# Patient Record
Sex: MALE | Race: WHITE | NOT HISPANIC OR LATINO | Employment: OTHER | URBAN - METROPOLITAN AREA
[De-identification: names, ages, dates, MRNs, and addresses within clinical notes are randomized per-mention and may not be internally consistent; named-entity substitution may affect disease eponyms.]

---

## 2021-02-26 RX ORDER — GLIPIZIDE 5 MG/1
TABLET ORAL
Qty: 90 TABLET | Refills: 1 | Status: SHIPPED | OUTPATIENT
Start: 2021-02-26 | End: 2021-05-04 | Stop reason: ALTCHOICE

## 2021-04-01 RX ORDER — AMLODIPINE BESYLATE 10 MG/1
TABLET ORAL
Qty: 90 TABLET | Refills: 0 | Status: SHIPPED | OUTPATIENT
Start: 2021-04-01 | End: 2021-06-30

## 2021-04-18 RX ORDER — HYDROCHLOROTHIAZIDE 25 MG/1
TABLET ORAL
Qty: 90 TABLET | Refills: 1 | Status: SHIPPED | OUTPATIENT
Start: 2021-04-18 | End: 2021-09-13 | Stop reason: SDUPTHER

## 2021-05-03 ENCOUNTER — OFFICE VISIT (OUTPATIENT)
Dept: INTERNAL MEDICINE | Facility: CLINIC | Age: 71
End: 2021-05-03

## 2021-05-03 VITALS
RESPIRATION RATE: 16 BRPM | HEIGHT: 72 IN | DIASTOLIC BLOOD PRESSURE: 82 MMHG | OXYGEN SATURATION: 98 % | SYSTOLIC BLOOD PRESSURE: 134 MMHG | BODY MASS INDEX: 30.88 KG/M2 | HEART RATE: 64 BPM | TEMPERATURE: 97.5 F | WEIGHT: 228 LBS

## 2021-05-03 DIAGNOSIS — I10 ESSENTIAL HYPERTENSION: ICD-10-CM

## 2021-05-03 DIAGNOSIS — E78.2 MIXED HYPERLIPIDEMIA: ICD-10-CM

## 2021-05-03 DIAGNOSIS — E11.8 TYPE II DIABETES MELLITUS WITH COMPLICATION (HCC): Primary | ICD-10-CM

## 2021-05-03 PROCEDURE — 99214 OFFICE O/P EST MOD 30 MIN: CPT | Performed by: INTERNAL MEDICINE

## 2021-05-03 RX ORDER — MAG HYDROX/ALUMINUM HYD/SIMETH 400-400-40
450 SUSPENSION, ORAL (FINAL DOSE FORM) ORAL
COMMUNITY

## 2021-05-03 RX ORDER — ASPIRIN 325 MG
325 TABLET ORAL DAILY
COMMUNITY
End: 2021-10-08 | Stop reason: ALTCHOICE

## 2021-05-03 RX ORDER — LOSARTAN POTASSIUM 100 MG/1
100 TABLET ORAL DAILY
Qty: 90 TABLET | Refills: 2 | Status: SHIPPED | OUTPATIENT
Start: 2021-05-03 | End: 2021-08-17

## 2021-05-03 RX ORDER — LOSARTAN POTASSIUM 100 MG/1
TABLET ORAL
COMMUNITY
Start: 2021-04-18 | End: 2021-05-03 | Stop reason: SDUPTHER

## 2021-05-03 RX ORDER — SIMVASTATIN 40 MG
TABLET ORAL
COMMUNITY
Start: 2021-04-07 | End: 2021-09-13 | Stop reason: SDUPTHER

## 2021-05-03 NOTE — PROGRESS NOTES
"Chief Complaint  Hyperlipidemia, Hypertension, and Diabetes    Subjective          Becki Cruz presents to [facility]  History of Present Illness  Blood sugar has reportedly been good.  No polyuria or polydipsia or visual changes.  No chest pains or shortness of breath.  Taking medication as prescribed.  No side effects reported.  Some numbness in the feet  Objective   Vital Signs:   /82 (BP Location: Left arm, Patient Position: Sitting, Cuff Size: Large Adult)   Pulse 64   Temp 97.5 °F (36.4 °C) (Temporal)   Resp 16   Ht 182.9 cm (72\")   Wt 103 kg (228 lb)   SpO2 98%   BMI 30.92 kg/m²     Physical Exam  Vitals and nursing note reviewed.   Constitutional:       Appearance: Normal appearance.   HENT:      Head: Normocephalic and atraumatic.      Nose: Nose normal.      Mouth/Throat:      Mouth: Mucous membranes are moist.      Pharynx: Oropharynx is clear.   Eyes:      Extraocular Movements: Extraocular movements intact.      Conjunctiva/sclera: Conjunctivae normal.      Pupils: Pupils are equal, round, and reactive to light.   Cardiovascular:      Rate and Rhythm: Normal rate and regular rhythm.      Pulses: Normal pulses.      Heart sounds: No murmur heard.   No friction rub. No gallop.    Pulmonary:      Effort: Pulmonary effort is normal.      Breath sounds: Normal breath sounds. No wheezing, rhonchi or rales.   Abdominal:      General: Abdomen is flat.      Palpations: Abdomen is soft.   Musculoskeletal:         General: No swelling.      Cervical back: Neck supple.      Right lower leg: No edema.      Left lower leg: No edema.   Lymphadenopathy:      Cervical: No cervical adenopathy.   Skin:     General: Skin is warm.      Capillary Refill: Capillary refill takes less than 2 seconds.   Neurological:      General: No focal deficit present.      Mental Status: He is alert and oriented to person, place, and time.      Sensory: Sensory deficit present. "        Result Review : Previous lab work from [facility]                Assessment and Plan type 2 diabetes with weight gain.  He thinks his blood sugars have been pretty well controlled over the past several months.  He does complain of some numbness in the feet.  Not particularly painful and we had a lengthy discussion about neuropathy and blood sugar control.  Continue working on weight reduction.  Follow-up in 3 months for wellness and will talk about closing vaccine care gaps.  Diagnoses and all orders for this visit:    1. Type II diabetes mellitus with complication (CMS/[facility]) (Primary)  -     Hepatitis C Antibody  -     Comprehensive Metabolic Panel  -     Hemoglobin A1c  -     Lipid Panel With / Chol / HDL Ratio  -     Cancel: Microalbumin / Creatinine Urine Ratio - Urine, Clean Catch  -     TSH  -     Ambulatory Referral For Screening Colonoscopy    2. Essential hypertension  -     Hepatitis C Antibody  -     Comprehensive Metabolic Panel  -     Hemoglobin A1c  -     Lipid Panel With / Chol / HDL Ratio  -     Cancel: Microalbumin / Creatinine Urine Ratio - Urine, Clean Catch  -     TSH  -     Ambulatory Referral For Screening Colonoscopy    3. Mixed hyperlipidemia    Other orders  -     losartan (COZAAR) 100 MG tablet; Take 1 tablet by mouth Daily.  Dispense: 90 tablet; Refill: 2        Follow Up   Return in about 3 months (around 8/3/2021) for Medicare Wellness.  Patient was given instructions and counseling regarding his condition or for health maintenance advice. Please see specific information pulled into the AVS if appropriate.

## 2021-05-04 LAB
ALBUMIN SERPL-MCNC: 4.5 G/DL (ref 3.5–5.2)
ALBUMIN/GLOB SERPL: 1.8 G/DL
ALP SERPL-CCNC: 69 U/L (ref 39–117)
ALT SERPL-CCNC: 21 U/L (ref 1–41)
AST SERPL-CCNC: 19 U/L (ref 1–40)
BILIRUB SERPL-MCNC: 0.5 MG/DL (ref 0–1.2)
BUN SERPL-MCNC: 19 MG/DL (ref 8–23)
BUN/CREAT SERPL: 24.7 (ref 7–25)
CALCIUM SERPL-MCNC: 10.5 MG/DL (ref 8.6–10.5)
CHLORIDE SERPL-SCNC: 100 MMOL/L (ref 98–107)
CHOLEST SERPL-MCNC: 136 MG/DL (ref 0–200)
CHOLEST/HDLC SERPL: 3.16 {RATIO}
CO2 SERPL-SCNC: 24.7 MMOL/L (ref 22–29)
CREAT SERPL-MCNC: 0.77 MG/DL (ref 0.76–1.27)
GLOBULIN SER CALC-MCNC: 2.5 GM/DL
GLUCOSE SERPL-MCNC: 170 MG/DL (ref 65–99)
HBA1C MFR BLD: 8.4 % (ref 4.8–5.6)
HCV AB S/CO SERPL IA: <0.1 S/CO RATIO (ref 0–0.9)
HDLC SERPL-MCNC: 43 MG/DL (ref 40–60)
LDLC SERPL CALC-MCNC: 70 MG/DL (ref 0–100)
POTASSIUM SERPL-SCNC: 4.2 MMOL/L (ref 3.5–5.2)
PROT SERPL-MCNC: 7 G/DL (ref 6–8.5)
SODIUM SERPL-SCNC: 138 MMOL/L (ref 136–145)
TRIGL SERPL-MCNC: 127 MG/DL (ref 0–150)
TSH SERPL DL<=0.005 MIU/L-ACNC: 2.23 UIU/ML (ref 0.27–4.2)
VLDLC SERPL CALC-MCNC: 23 MG/DL (ref 5–40)

## 2021-05-04 RX ORDER — GLIPIZIDE 10 MG/1
10 TABLET, FILM COATED, EXTENDED RELEASE ORAL DAILY
Qty: 90 TABLET | Refills: 0 | Status: SHIPPED | OUTPATIENT
Start: 2021-05-04 | End: 2021-05-04 | Stop reason: SDUPTHER

## 2021-05-04 RX ORDER — GLIPIZIDE 10 MG/1
10 TABLET, FILM COATED, EXTENDED RELEASE ORAL DAILY
Qty: 90 TABLET | Refills: 0 | Status: SHIPPED | OUTPATIENT
Start: 2021-05-04 | End: 2021-06-14 | Stop reason: SDUPTHER

## 2021-06-14 RX ORDER — GLIPIZIDE 10 MG/1
10 TABLET, FILM COATED, EXTENDED RELEASE ORAL DAILY
Qty: 90 TABLET | Refills: 0 | Status: SHIPPED | OUTPATIENT
Start: 2021-06-14 | End: 2021-06-18 | Stop reason: SDUPTHER

## 2021-06-18 ENCOUNTER — TELEPHONE (OUTPATIENT)
Dept: INTERNAL MEDICINE | Facility: CLINIC | Age: 71
End: 2021-06-18

## 2021-06-18 RX ORDER — GLIPIZIDE 10 MG/1
10 TABLET, FILM COATED, EXTENDED RELEASE ORAL DAILY
Qty: 90 TABLET | Refills: 0 | Status: SHIPPED | OUTPATIENT
Start: 2021-06-18 | End: 2021-09-13 | Stop reason: SDUPTHER

## 2021-06-18 NOTE — TELEPHONE ENCOUNTER
10 mg was already sent to [facility] but I resent it.  We increased it because it is A1c was elevated last time

## 2021-06-30 RX ORDER — AMLODIPINE BESYLATE 10 MG/1
TABLET ORAL
Qty: 90 TABLET | Refills: 1 | Status: SHIPPED | OUTPATIENT
Start: 2021-06-30 | End: 2021-09-13 | Stop reason: SDUPTHER

## 2021-08-17 RX ORDER — LOSARTAN POTASSIUM 100 MG/1
TABLET ORAL
Qty: 90 TABLET | Refills: 3 | Status: SHIPPED | OUTPATIENT
Start: 2021-08-17 | End: 2021-09-13 | Stop reason: SDUPTHER

## 2021-09-13 RX ORDER — GLIPIZIDE 10 MG/1
10 TABLET, FILM COATED, EXTENDED RELEASE ORAL DAILY
Qty: 90 TABLET | Refills: 0 | Status: SHIPPED | OUTPATIENT
Start: 2021-09-13 | End: 2022-03-08

## 2021-09-13 RX ORDER — HYDROCHLOROTHIAZIDE 25 MG/1
25 TABLET ORAL DAILY
Qty: 90 TABLET | Refills: 1 | Status: SHIPPED | OUTPATIENT
Start: 2021-09-13 | End: 2022-06-02

## 2021-09-13 RX ORDER — AMLODIPINE BESYLATE 10 MG/1
10 TABLET ORAL DAILY
Qty: 90 TABLET | Refills: 1 | Status: SHIPPED | OUTPATIENT
Start: 2021-09-13 | End: 2022-06-02

## 2021-09-13 RX ORDER — LOSARTAN POTASSIUM 100 MG/1
100 TABLET ORAL DAILY
Qty: 90 TABLET | Refills: 3 | Status: SHIPPED | OUTPATIENT
Start: 2021-09-13 | End: 2022-11-05

## 2021-09-13 RX ORDER — SIMVASTATIN 40 MG
40 TABLET ORAL NIGHTLY
Qty: 90 TABLET | Refills: 1 | Status: SHIPPED | OUTPATIENT
Start: 2021-09-13 | End: 2021-09-28

## 2021-09-13 NOTE — TELEPHONE ENCOUNTER
PATIENT IS WANTING ALL OF HIS MEDICATIONS SENT TO [facility]         Caller: Becki Cruz    Relationship: Self    Best call back number:   [phone] (H)    Medication needed:   Requested Prescriptions     Pending Prescriptions Disp Refills   • metFORMIN (GLUCOPHAGE) 1000 MG tablet 180 tablet 2     Sig: Take 1 tablet by mouth 2 (Two) Times a Day With Meals.   • simvastatin (ZOCOR) 40 MG tablet     • losartan (COZAAR) 100 MG tablet 90 tablet 3     Sig: Take 1 tablet by mouth Daily.   • hydroCHLOROthiazide (HYDRODIURIL) 25 MG tablet 90 tablet 1     Sig: Take 1 tablet by mouth Daily.   • glipizide (Glucotrol XL) 10 MG 24 hr tablet 90 tablet 0     Sig: Take 1 tablet by mouth Daily.   • amLODIPine (NORVASC) 10 MG tablet 90 tablet 1     Sig: Take 1 tablet by mouth Daily.       When do you need the refill by:  ASAP       What additional details did the patient provide when requesting the medication:     Does the patient have less than a 3 day supply:  [x] Yes  [] No    What is the patient's preferred pharmacy: [address] [address] [facility] [employer] [facility] [phone] [facility] [phone]

## 2021-09-28 RX ORDER — SIMVASTATIN 40 MG
TABLET ORAL
Qty: 90 TABLET | Refills: 2 | Status: SHIPPED | OUTPATIENT
Start: 2021-09-28 | End: 2023-03-07

## 2021-10-08 ENCOUNTER — OFFICE VISIT (OUTPATIENT)
Dept: INTERNAL MEDICINE | Facility: CLINIC | Age: 71
End: 2021-10-08

## 2021-10-08 VITALS
OXYGEN SATURATION: 98 % | HEIGHT: 72 IN | WEIGHT: 229 LBS | BODY MASS INDEX: 31.02 KG/M2 | SYSTOLIC BLOOD PRESSURE: 142 MMHG | RESPIRATION RATE: 16 BRPM | HEART RATE: 62 BPM | TEMPERATURE: 97.5 F | DIASTOLIC BLOOD PRESSURE: 82 MMHG

## 2021-10-08 DIAGNOSIS — Z12.5 SPECIAL SCREENING FOR MALIGNANT NEOPLASM OF PROSTATE: ICD-10-CM

## 2021-10-08 DIAGNOSIS — I10 ESSENTIAL HYPERTENSION: Primary | ICD-10-CM

## 2021-10-08 DIAGNOSIS — E11.8 TYPE II DIABETES MELLITUS WITH COMPLICATION (HCC): ICD-10-CM

## 2021-10-08 DIAGNOSIS — E78.2 MIXED HYPERLIPIDEMIA: ICD-10-CM

## 2021-10-08 LAB
ALBUMIN SERPL-MCNC: 4.9 G/DL (ref 3.5–5.2)
ALBUMIN/GLOB SERPL: 1.9 G/DL
ALP SERPL-CCNC: 69 U/L (ref 39–117)
ALT SERPL-CCNC: 25 U/L (ref 1–41)
APPEARANCE UR: CLEAR
AST SERPL-CCNC: 21 U/L (ref 1–40)
BACTERIA #/AREA URNS HPF: NORMAL /HPF
BASOPHILS # BLD AUTO: 0.06 10*3/MM3 (ref 0–0.2)
BASOPHILS NFR BLD AUTO: 0.6 % (ref 0–1.5)
BILIRUB SERPL-MCNC: 0.4 MG/DL (ref 0–1.2)
BILIRUB UR QL STRIP: NEGATIVE
BUN SERPL-MCNC: 17 MG/DL (ref 8–23)
BUN/CREAT SERPL: 19.3 (ref 7–25)
CALCIUM SERPL-MCNC: 10.2 MG/DL (ref 8.6–10.5)
CASTS URNS MICRO: NORMAL
CHLORIDE SERPL-SCNC: 99 MMOL/L (ref 98–107)
CHOLEST SERPL-MCNC: 142 MG/DL (ref 0–200)
CHOLEST/HDLC SERPL: 3.74 {RATIO}
CO2 SERPL-SCNC: 26.5 MMOL/L (ref 22–29)
COLOR UR: YELLOW
CREAT SERPL-MCNC: 0.88 MG/DL (ref 0.76–1.27)
EOSINOPHIL # BLD AUTO: 0.24 10*3/MM3 (ref 0–0.4)
EOSINOPHIL NFR BLD AUTO: 2.5 % (ref 0.3–6.2)
EPI CELLS #/AREA URNS HPF: NORMAL /HPF
ERYTHROCYTE [DISTWIDTH] IN BLOOD BY AUTOMATED COUNT: 13.5 % (ref 12.3–15.4)
GLOBULIN SER CALC-MCNC: 2.6 GM/DL
GLUCOSE SERPL-MCNC: 149 MG/DL (ref 65–99)
GLUCOSE UR QL: NEGATIVE
HBA1C MFR BLD: 7.7 % (ref 4.8–5.6)
HCT VFR BLD AUTO: 42.5 % (ref 37.5–51)
HDLC SERPL-MCNC: 38 MG/DL (ref 40–60)
HGB BLD-MCNC: 13.8 G/DL (ref 13–17.7)
HGB UR QL STRIP: NEGATIVE
IMM GRANULOCYTES # BLD AUTO: 0.02 10*3/MM3 (ref 0–0.05)
IMM GRANULOCYTES NFR BLD AUTO: 0.2 % (ref 0–0.5)
KETONES UR QL STRIP: NEGATIVE
LDLC SERPL CALC-MCNC: 76 MG/DL (ref 0–100)
LEUKOCYTE ESTERASE UR QL STRIP: NEGATIVE
LYMPHOCYTES # BLD AUTO: 3.41 10*3/MM3 (ref 0.7–3.1)
LYMPHOCYTES NFR BLD AUTO: 35.9 % (ref 19.6–45.3)
MCH RBC QN AUTO: 29 PG (ref 26.6–33)
MCHC RBC AUTO-ENTMCNC: 32.5 G/DL (ref 31.5–35.7)
MCV RBC AUTO: 89.3 FL (ref 79–97)
MONOCYTES # BLD AUTO: 0.77 10*3/MM3 (ref 0.1–0.9)
MONOCYTES NFR BLD AUTO: 8.1 % (ref 5–12)
NEUTROPHILS # BLD AUTO: 4.99 10*3/MM3 (ref 1.7–7)
NEUTROPHILS NFR BLD AUTO: 52.7 % (ref 42.7–76)
NITRITE UR QL STRIP: NEGATIVE
NRBC BLD AUTO-RTO: 0 /100 WBC (ref 0–0.2)
PH UR STRIP: 6 [PH] (ref 5–8)
PLATELET # BLD AUTO: 313 10*3/MM3 (ref 140–450)
POTASSIUM SERPL-SCNC: 4.4 MMOL/L (ref 3.5–5.2)
PROT SERPL-MCNC: 7.5 G/DL (ref 6–8.5)
PROT UR QL STRIP: NEGATIVE
PSA SERPL-MCNC: 4.97 NG/ML (ref 0–4)
RBC # BLD AUTO: 4.76 10*6/MM3 (ref 4.14–5.8)
RBC #/AREA URNS HPF: NORMAL /HPF
SODIUM SERPL-SCNC: 140 MMOL/L (ref 136–145)
SP GR UR: 1.02 (ref 1–1.03)
TRIGL SERPL-MCNC: 163 MG/DL (ref 0–150)
TSH SERPL DL<=0.005 MIU/L-ACNC: 2.2 UIU/ML (ref 0.27–4.2)
UROBILINOGEN UR STRIP-MCNC: NORMAL MG/DL
VLDLC SERPL CALC-MCNC: 28 MG/DL (ref 5–40)
WBC # BLD AUTO: 9.49 10*3/MM3 (ref 3.4–10.8)
WBC #/AREA URNS HPF: NORMAL /HPF

## 2021-10-08 PROCEDURE — 90662 IIV NO PRSV INCREASED AG IM: CPT | Performed by: INTERNAL MEDICINE

## 2021-10-08 PROCEDURE — G0008 ADMIN INFLUENZA VIRUS VAC: HCPCS | Performed by: INTERNAL MEDICINE

## 2021-10-08 PROCEDURE — G0439 PPPS, SUBSEQ VISIT: HCPCS | Performed by: INTERNAL MEDICINE

## 2021-10-08 RX ORDER — ASPIRIN 81 MG/1
81 TABLET ORAL DAILY
COMMUNITY
End: 2022-10-19 | Stop reason: ALTCHOICE

## 2021-10-08 NOTE — PROGRESS NOTES
The ABCs of the Annual Wellness Visit  Subsequent Medicare Wellness Visit    No chief complaint on file.     Subjective    History of Present Illness:  Becki Cruz is a 71 y.o. male who presents for a Subsequent Medicare Wellness Visit.    The following portions of the patient's history were reviewed and   updated as appropriate: allergies, current medications, past family history, past medical history, past social history, past surgical history and problem list.    Compared to one year ago, the patient feels his physical   health is the same.    Compared to one year ago, the patient feels his mental   health is the same.    Recent Hospitalizations:  He was not admitted to the hospital during the last year.       Current Medical Providers:  Patient Care Team:  Esau Munson MD (Tony) as PCP - General (Internal Medicine)    Outpatient Medications Prior to Visit   Medication Sig Dispense Refill   • amLODIPine (NORVASC) 10 MG tablet Take 1 tablet by mouth Daily. 90 tablet 1   • aspirin 325 MG tablet Take 325 mg by mouth Daily.     • glipizide (Glucotrol XL) 10 MG 24 hr tablet Take 1 tablet by mouth Daily. 90 tablet 0   • hydroCHLOROthiazide (HYDRODIURIL) 25 MG tablet Take 1 tablet by mouth Daily. 90 tablet 1   • losartan (COZAAR) 100 MG tablet Take 1 tablet by mouth Daily. 90 tablet 3   • metFORMIN (GLUCOPHAGE) 1000 MG tablet Take 1 tablet by mouth 2 (Two) Times a Day With Meals. 180 tablet 2   • Saw Palmetto 450 MG capsule Take 450 mg by mouth.     • simvastatin (ZOCOR) 40 MG tablet TAKE 1 TABLET DAILY IN THE EVENING 90 tablet 2     No facility-administered medications prior to visit.       No opioid medication identified on active medication list. I have reviewed chart for other potential  high risk medication/s and harmful drug interactions in the elderly.          Aspirin is on active medication list. Aspirin use is indicated based on review of current medical condition/s. Pros and cons of this therapy  have been discussed today. Benefits of this medication outweigh potential harm.  Patient has been encouraged to continue taking this medication.  .      Patient Active Problem List   Diagnosis   • Hypertension     Advance Care Planning  Advance Directive is not on file.  ACP discussion was held with the patient during this visit. Patient has an advance directive (not in EMR), copy requested.          Objective    There were no vitals filed for this visit.  BMI Readings from Last 1 Encounters:   05/03/21 30.92 kg/m²   BMI is above normal parameters. Recommendations include: educational material, exercise counseling and nutrition counseling    Does the patient have evidence of cognitive impairment? No    Physical Exam  Skin:     Comments: Diabetic Foot Exam Performed                 HEALTH RISK ASSESSMENT    Smoking Status:  Social History     Tobacco Use   Smoking Status Never Smoker     Alcohol Consumption:  Social History     Substance and Sexual Activity   Alcohol Use Not on file    Comment: UNKNOWN     Fall Risk Screen:    STEADI Fall Risk Assessment was completed, and patient is at LOW risk for falls.Assessment completed on:5/3/2021    Depression Screening:  PHQ-2/PHQ-9 Depression Screening 5/3/2021   Little interest or pleasure in doing things 0   Feeling down, depressed, or hopeless 0   Total Score 0       Health Habits and Functional and Cognitive Screening:  No flowsheet data found.    Age-appropriate Screening Schedule:  Refer to the list below for future screening recommendations based on patient's age, sex and/or medical conditions. Orders for these recommended tests are listed in the plan section. The patient has been provided with a written plan.    Health Maintenance   Topic Date Due   • URINE MICROALBUMIN  Never done   • TDAP/TD VACCINES (1 - Tdap) Never done   • ZOSTER VACCINE (1 of 2) Never done   • DIABETIC FOOT EXAM  Never done   • INFLUENZA VACCINE  08/01/2021   • HEMOGLOBIN A1C  11/03/2021   •  DIABETIC EYE EXAM  12/02/2021   • LIPID PANEL  05/03/2022              Assessment/Plan   CMS Preventative Services Quick Reference  Risk Factors Identified During Encounter  Immunizations Discussed/Encouraged (specific Immunizations; Influenza, Shingrix and COVID19  The above risks/problems have been discussed with the patient.  Follow up actions/plans if indicated are seen below in the Assessment/Plan Section.  Pertinent information has been shared with the patient in the After Visit Summary.    There are no diagnoses linked to this encounter.    Follow Up:   No follow-ups on file.     An After Visit Summary and PPPS were made available to the patient.        I spent 30 minutes caring for Becki on this date of service. This time includes time spent by me in the following activities:preparing for the visit, reviewing tests, obtaining and/or reviewing a separately obtained history and performing a medically appropriate examination and/or evaluation          Overall he is doing pretty well.  Mild blood pressure elevation.  My repeat blood pressure was better.  Encouraged working on diet and exercise.  Reduce the aspirin dose to 81 mg daily.  Recheck lab work.  Another referral for colonoscopy.  Consider Tdap and Shingrix

## 2021-10-09 LAB
ALBUMIN/CREAT UR: 9 MG/G CREAT (ref 0–29)
CREAT UR-MCNC: 150.5 MG/DL
MICROALBUMIN UR-MCNC: 14.2 UG/ML

## 2022-01-12 ENCOUNTER — OFFICE VISIT (OUTPATIENT)
Dept: INTERNAL MEDICINE | Facility: CLINIC | Age: 72
End: 2022-01-12

## 2022-01-12 VITALS
SYSTOLIC BLOOD PRESSURE: 136 MMHG | HEIGHT: 72 IN | RESPIRATION RATE: 16 BRPM | DIASTOLIC BLOOD PRESSURE: 86 MMHG | OXYGEN SATURATION: 100 % | HEART RATE: 68 BPM | BODY MASS INDEX: 31.02 KG/M2 | TEMPERATURE: 96.9 F | WEIGHT: 229 LBS

## 2022-01-12 DIAGNOSIS — E11.8 TYPE II DIABETES MELLITUS WITH COMPLICATION: ICD-10-CM

## 2022-01-12 DIAGNOSIS — E78.2 MIXED HYPERLIPIDEMIA: ICD-10-CM

## 2022-01-12 DIAGNOSIS — I10 ESSENTIAL HYPERTENSION: Primary | ICD-10-CM

## 2022-01-12 PROCEDURE — 99214 OFFICE O/P EST MOD 30 MIN: CPT | Performed by: INTERNAL MEDICINE

## 2022-01-12 NOTE — PROGRESS NOTES
"Chief Complaint  Diabetes, Hypertension, and Hyperlipidemia    Subjective          eBcki Cruz presents to [facility]  History of Present Illness  Blood sugar has reportedly been good.  No polyuria or polydipsia or visual changes.  No chest pains or shortness of breath.  Taking medication as prescribed.  No side effects reported.  Objective   Vital Signs:   /86   Pulse 68   Temp 96.9 °F (36.1 °C) (Temporal)   Resp 16   Ht 182.9 cm (72\")   Wt 104 kg (229 lb)   SpO2 100%   BMI 31.06 kg/m²     Physical Exam  Vitals and nursing note reviewed.   Constitutional:       Appearance: Normal appearance.   HENT:      Head: Normocephalic and atraumatic.   Cardiovascular:      Rate and Rhythm: Normal rate and regular rhythm.   Pulmonary:      Effort: Pulmonary effort is normal.      Breath sounds: Normal breath sounds.   Abdominal:      General: Abdomen is flat.      Palpations: Abdomen is soft.   Musculoskeletal:         General: No swelling or deformity.      Cervical back: Neck supple.      Right lower leg: No edema.      Left lower leg: No edema.   Skin:     General: Skin is warm.      Capillary Refill: Capillary refill takes 2 to 3 seconds.      Findings: No rash.   Neurological:      General: No focal deficit present.      Mental Status: He is alert and oriented to person, place, and time.      Sensory: Sensory deficit present.        Result Review : Previous labs                Assessment and Plan type 2 diabetes and hypertension.  He has not lost any weight and has been a little less active over this past several months.  He is going to refocus on that.  My repeat blood pressure was little bit better.  No medication changes.  Recheck lab work and follow-up accordingly.  Colon cancer screening needs to be done and he is trying to get that scheduled but he has a lot going on situationally with his family so he is going to try to schedule that later this " year    Diagnoses and all orders for this visit:    1. Essential hypertension (Primary)  -     Comprehensive Metabolic Panel  -     Hemoglobin A1c  -     Lipid Panel With / Chol / HDL Ratio  -     TSH    2. Type II diabetes mellitus with complication (HCC)  -     Comprehensive Metabolic Panel  -     Hemoglobin A1c  -     Lipid Panel With / Chol / HDL Ratio  -     TSH    3. Mixed hyperlipidemia  -     Comprehensive Metabolic Panel  -     Hemoglobin A1c  -     Lipid Panel With / Chol / HDL Ratio  -     TSH        Follow Up   Return in about 4 months (around 5/12/2022).  Patient was given instructions and counseling regarding his condition or for health maintenance advice. Please see specific information pulled into the AVS if appropriate.

## 2022-01-13 LAB
ALBUMIN SERPL-MCNC: 4.5 G/DL (ref 3.7–4.7)
ALBUMIN/GLOB SERPL: 1.7 {RATIO} (ref 1.2–2.2)
ALP SERPL-CCNC: 74 IU/L (ref 44–121)
ALT SERPL-CCNC: 24 IU/L (ref 0–44)
AST SERPL-CCNC: 26 IU/L (ref 0–40)
BILIRUB SERPL-MCNC: 0.4 MG/DL (ref 0–1.2)
BUN SERPL-MCNC: 17 MG/DL (ref 8–27)
BUN/CREAT SERPL: 17 (ref 10–24)
CALCIUM SERPL-MCNC: 10.2 MG/DL (ref 8.6–10.2)
CHLORIDE SERPL-SCNC: 99 MMOL/L (ref 96–106)
CHOLEST SERPL-MCNC: 145 MG/DL (ref 100–199)
CHOLEST/HDLC SERPL: 3.5 RATIO (ref 0–5)
CO2 SERPL-SCNC: 24 MMOL/L (ref 20–29)
CREAT SERPL-MCNC: 1.02 MG/DL (ref 0.76–1.27)
GLOBULIN SER CALC-MCNC: 2.7 G/DL (ref 1.5–4.5)
GLUCOSE SERPL-MCNC: 177 MG/DL (ref 65–99)
HBA1C MFR BLD: 8.5 % (ref 4.8–5.6)
HDLC SERPL-MCNC: 41 MG/DL
LDLC SERPL CALC-MCNC: 73 MG/DL (ref 0–99)
POTASSIUM SERPL-SCNC: 4.2 MMOL/L (ref 3.5–5.2)
PROT SERPL-MCNC: 7.2 G/DL (ref 6–8.5)
SODIUM SERPL-SCNC: 139 MMOL/L (ref 134–144)
TRIGL SERPL-MCNC: 184 MG/DL (ref 0–149)
TSH SERPL DL<=0.005 MIU/L-ACNC: 3.42 UIU/ML (ref 0.45–4.5)
VLDLC SERPL CALC-MCNC: 31 MG/DL (ref 5–40)

## 2022-03-08 RX ORDER — GLIPIZIDE 10 MG/1
TABLET, FILM COATED, EXTENDED RELEASE ORAL
Qty: 90 TABLET | Refills: 0 | Status: SHIPPED | OUTPATIENT
Start: 2022-03-08 | End: 2022-05-16

## 2022-05-12 ENCOUNTER — TELEPHONE (OUTPATIENT)
Dept: INTERNAL MEDICINE | Facility: CLINIC | Age: 72
End: 2022-05-12

## 2022-05-12 NOTE — TELEPHONE ENCOUNTER
Jakob to read:  Called patient to let them know that I canceled their appointment on the 16th because Dr. Munson is going to be out for a bit die to illness. They will need to reschedule.

## 2022-05-16 RX ORDER — GLIPIZIDE 10 MG/1
TABLET, FILM COATED, EXTENDED RELEASE ORAL
Qty: 90 TABLET | Refills: 0 | Status: SHIPPED | OUTPATIENT
Start: 2022-05-16 | End: 2022-08-16 | Stop reason: SDUPTHER

## 2022-05-16 NOTE — TELEPHONE ENCOUNTER
Rx Refill Note  Requested Prescriptions     Pending Prescriptions Disp Refills   • glipizide (GLUCOTROL XL) 10 MG 24 hr tablet [Pharmacy Med Name: glipiZIDE XL 10 MG TABLET] 90 tablet 0     Sig: TAKE ONE TABLET BY MOUTH DAILY      Last office visit with prescribing clinician: 1/12/2022      Next office visit with prescribing clinician: 6/7/2022            Tina Hernandez MA  05/16/22, 16:07 EDT

## 2022-06-02 RX ORDER — HYDROCHLOROTHIAZIDE 25 MG/1
TABLET ORAL
Qty: 90 TABLET | Refills: 1 | Status: SHIPPED | OUTPATIENT
Start: 2022-06-02 | End: 2022-11-28

## 2022-06-02 RX ORDER — AMLODIPINE BESYLATE 10 MG/1
TABLET ORAL
Qty: 90 TABLET | Refills: 1 | Status: SHIPPED | OUTPATIENT
Start: 2022-06-02 | End: 2022-11-28

## 2022-07-12 ENCOUNTER — OFFICE VISIT (OUTPATIENT)
Dept: INTERNAL MEDICINE | Facility: CLINIC | Age: 72
End: 2022-07-12

## 2022-07-12 VITALS
OXYGEN SATURATION: 98 % | BODY MASS INDEX: 31.29 KG/M2 | SYSTOLIC BLOOD PRESSURE: 138 MMHG | HEART RATE: 75 BPM | DIASTOLIC BLOOD PRESSURE: 84 MMHG | TEMPERATURE: 96.9 F | WEIGHT: 231 LBS | HEIGHT: 72 IN | RESPIRATION RATE: 16 BRPM

## 2022-07-12 DIAGNOSIS — I10 ESSENTIAL HYPERTENSION: ICD-10-CM

## 2022-07-12 DIAGNOSIS — Z12.11 COLON CANCER SCREENING: ICD-10-CM

## 2022-07-12 DIAGNOSIS — E78.2 MIXED HYPERLIPIDEMIA: ICD-10-CM

## 2022-07-12 DIAGNOSIS — E11.8 TYPE II DIABETES MELLITUS WITH COMPLICATION: Primary | ICD-10-CM

## 2022-07-12 PROCEDURE — 99214 OFFICE O/P EST MOD 30 MIN: CPT | Performed by: INTERNAL MEDICINE

## 2022-07-12 RX ORDER — ASPIRIN 325 MG
325 TABLET ORAL DAILY
COMMUNITY
End: 2022-10-19 | Stop reason: ALTCHOICE

## 2022-07-12 NOTE — PROGRESS NOTES
"Chief Complaint  Diabetes and Hypertension    Subjective        Becki Cruz presents to [facility]  History of Present Illness  Blood sugar has reportedly been ?.  No polyuria or polydipsia or visual changes.  No chest pains or shortness of breath.  Taking medication as prescribed.  No side effects reported.  He has not been as active because of the heat and worried his sugar might be a little higher than he would like.    Objective   Vital Signs:  /84 (BP Location: Left arm, Patient Position: Sitting, Cuff Size: Large Adult)   Pulse 75   Temp 96.9 °F (36.1 °C) (Temporal)   Resp 16   Ht 182.9 cm (72\")   Wt 105 kg (231 lb)   SpO2 98%   BMI 31.33 kg/m²   Estimated body mass index is 31.33 kg/m² as calculated from the following:    Height as of this encounter: 182.9 cm (72\").    Weight as of this encounter: 105 kg (231 lb).          Physical Exam  Vitals and nursing note reviewed.   Constitutional:       Appearance: Normal appearance.   HENT:      Head: Normocephalic and atraumatic.   Cardiovascular:      Rate and Rhythm: Normal rate and regular rhythm.   Pulmonary:      Effort: Pulmonary effort is normal.      Breath sounds: Normal breath sounds.   Abdominal:      General: Abdomen is flat.      Palpations: Abdomen is soft.   Musculoskeletal:         General: No swelling or deformity.      Cervical back: Neck supple.      Right lower leg: No edema.      Left lower leg: No edema.   Skin:     General: Skin is warm.      Capillary Refill: Capillary refill takes less than 2 seconds.      Findings: No rash.   Neurological:      General: No focal deficit present.      Mental Status: He is alert and oriented to person, place, and time.        Result Review :           Previous notes reviewed     Assessment and Plan   Diagnoses and all orders for this visit:    1. Type II diabetes mellitus with complication (HCC) (Primary)  -     Comprehensive Metabolic Panel  -   "   Hemoglobin A1c  -     Lipid Panel With / Chol / HDL Ratio  -     TSH    2. Mixed hyperlipidemia  -     Comprehensive Metabolic Panel  -     Hemoglobin A1c  -     Lipid Panel With / Chol / HDL Ratio  -     TSH    3. Essential hypertension    4. Colon cancer screening  -     Cologuard - Stool, Per Rectum; Future    Pretty good control historically although last A1c was elevated.  Encouraged activity maybe join the gym where he can exercise out of the heat.  Talked about vaccinations.  Cologuard ordered.  Follow-up 3 months for wellness         Follow Up   Return in about 3 months (around 10/12/2022) for Medicare Wellness.  Patient was given instructions and counseling regarding his condition or for health maintenance advice. Please see specific information pulled into the AVS if appropriate.

## 2022-07-13 LAB
ALBUMIN SERPL-MCNC: 4.4 G/DL (ref 3.7–4.7)
ALBUMIN/GLOB SERPL: 1.6 {RATIO} (ref 1.2–2.2)
ALP SERPL-CCNC: 64 IU/L (ref 44–121)
ALT SERPL-CCNC: 21 IU/L (ref 0–44)
AST SERPL-CCNC: 25 IU/L (ref 0–40)
BILIRUB SERPL-MCNC: 0.4 MG/DL (ref 0–1.2)
BUN SERPL-MCNC: 18 MG/DL (ref 8–27)
BUN/CREAT SERPL: 20 (ref 10–24)
CALCIUM SERPL-MCNC: 10.1 MG/DL (ref 8.6–10.2)
CHLORIDE SERPL-SCNC: 100 MMOL/L (ref 96–106)
CHOLEST SERPL-MCNC: 157 MG/DL (ref 100–199)
CHOLEST/HDLC SERPL: 3.8 RATIO (ref 0–5)
CO2 SERPL-SCNC: 22 MMOL/L (ref 20–29)
CREAT SERPL-MCNC: 0.88 MG/DL (ref 0.76–1.27)
EGFRCR SERPLBLD CKD-EPI 2021: 91 ML/MIN/1.73
GLOBULIN SER CALC-MCNC: 2.7 G/DL (ref 1.5–4.5)
GLUCOSE SERPL-MCNC: 195 MG/DL (ref 65–99)
HBA1C MFR BLD: 8.9 % (ref 4.8–5.6)
HDLC SERPL-MCNC: 41 MG/DL
LDLC SERPL CALC-MCNC: 92 MG/DL (ref 0–99)
POTASSIUM SERPL-SCNC: 4 MMOL/L (ref 3.5–5.2)
PROT SERPL-MCNC: 7.1 G/DL (ref 6–8.5)
SODIUM SERPL-SCNC: 139 MMOL/L (ref 134–144)
TRIGL SERPL-MCNC: 134 MG/DL (ref 0–149)
TSH SERPL DL<=0.005 MIU/L-ACNC: 2.58 UIU/ML (ref 0.45–4.5)
VLDLC SERPL CALC-MCNC: 24 MG/DL (ref 5–40)

## 2022-08-16 RX ORDER — GLIPIZIDE 10 MG/1
10 TABLET, FILM COATED, EXTENDED RELEASE ORAL DAILY
Qty: 90 TABLET | Refills: 4 | Status: SHIPPED | OUTPATIENT
Start: 2022-08-16

## 2022-10-19 ENCOUNTER — TELEPHONE (OUTPATIENT)
Dept: INTERNAL MEDICINE | Facility: CLINIC | Age: 72
End: 2022-10-19

## 2022-10-19 ENCOUNTER — OFFICE VISIT (OUTPATIENT)
Dept: INTERNAL MEDICINE | Facility: CLINIC | Age: 72
End: 2022-10-19

## 2022-10-19 VITALS
WEIGHT: 234 LBS | TEMPERATURE: 96.9 F | HEIGHT: 72 IN | SYSTOLIC BLOOD PRESSURE: 142 MMHG | RESPIRATION RATE: 16 BRPM | DIASTOLIC BLOOD PRESSURE: 92 MMHG | HEART RATE: 90 BPM | BODY MASS INDEX: 31.69 KG/M2 | OXYGEN SATURATION: 100 %

## 2022-10-19 DIAGNOSIS — I48.91 ATRIAL FIBRILLATION, UNSPECIFIED TYPE: ICD-10-CM

## 2022-10-19 DIAGNOSIS — I10 ESSENTIAL HYPERTENSION: ICD-10-CM

## 2022-10-19 DIAGNOSIS — R94.31 ABNORMAL EKG: ICD-10-CM

## 2022-10-19 DIAGNOSIS — Z00.00 ROUTINE GENERAL MEDICAL EXAMINATION AT A HEALTH CARE FACILITY: Primary | ICD-10-CM

## 2022-10-19 DIAGNOSIS — Z12.5 SPECIAL SCREENING FOR MALIGNANT NEOPLASM OF PROSTATE: ICD-10-CM

## 2022-10-19 DIAGNOSIS — E11.8 TYPE II DIABETES MELLITUS WITH COMPLICATION: ICD-10-CM

## 2022-10-19 PROCEDURE — G0439 PPPS, SUBSEQ VISIT: HCPCS | Performed by: INTERNAL MEDICINE

## 2022-10-19 PROCEDURE — G0008 ADMIN INFLUENZA VIRUS VAC: HCPCS | Performed by: INTERNAL MEDICINE

## 2022-10-19 PROCEDURE — 1159F MED LIST DOCD IN RCRD: CPT | Performed by: INTERNAL MEDICINE

## 2022-10-19 PROCEDURE — 1170F FXNL STATUS ASSESSED: CPT | Performed by: INTERNAL MEDICINE

## 2022-10-19 PROCEDURE — 90662 IIV NO PRSV INCREASED AG IM: CPT | Performed by: INTERNAL MEDICINE

## 2022-10-19 PROCEDURE — 93000 ELECTROCARDIOGRAM COMPLETE: CPT | Performed by: INTERNAL MEDICINE

## 2022-10-19 RX ORDER — ASPIRIN 81 MG/1
81 TABLET ORAL DAILY
COMMUNITY
End: 2022-11-21

## 2022-10-19 RX ORDER — METOPROLOL SUCCINATE 25 MG/1
25 TABLET, EXTENDED RELEASE ORAL DAILY
Qty: 30 TABLET | Refills: 1 | Status: SHIPPED | OUTPATIENT
Start: 2022-10-19 | End: 2022-12-14

## 2022-10-19 NOTE — PROGRESS NOTES
The ABCs of the Annual Wellness Visit  Subsequent Medicare Wellness Visit    Chief Complaint   Patient presents with   • Medicare Wellness-subsequent      Subjective    History of Present Illness:  Becki Cruz is a 72 y.o. male who presents for a Subsequent Medicare Wellness Visit.    The following portions of the patient's history were reviewed and   updated as appropriate: allergies, current medications, past family history, past medical history, past social history, past surgical history and problem list.    Compared to one year ago, the patient feels his physical   health is the same.    Compared to one year ago, the patient feels his mental   health is the same.    Recent Hospitalizations:  He was not admitted to the hospital during the last year.       Current Medical Providers:  Patient Care Team:  Esau Munson MD (Tony) as PCP - General (Internal Medicine)    Outpatient Medications Prior to Visit   Medication Sig Dispense Refill   • amLODIPine (NORVASC) 10 MG tablet TAKE ONE TABLET BY MOUTH DAILY 90 tablet 1   • aspirin 81 MG EC tablet Take 1 tablet by mouth Daily.     • glipizide (GLUCOTROL XL) 10 MG 24 hr tablet Take 1 tablet by mouth Daily. 90 tablet 4   • hydroCHLOROthiazide (HYDRODIURIL) 25 MG tablet TAKE ONE TABLET BY MOUTH DAILY 90 tablet 1   • losartan (COZAAR) 100 MG tablet Take 1 tablet by mouth Daily. 90 tablet 3   • metFORMIN (GLUCOPHAGE) 1000 MG tablet TAKE ONE TABLET BY MOUTH TWICE A DAY WITH MEALS 180 tablet 2   • Saw Palmetto 450 MG capsule Take 450 mg by mouth.     • simvastatin (ZOCOR) 40 MG tablet TAKE 1 TABLET DAILY IN THE EVENING 90 tablet 2   • aspirin 325 MG tablet Take 325 mg by mouth Daily.     • aspirin 81 MG EC tablet Take 81 mg by mouth Daily.       No facility-administered medications prior to visit.       No opioid medication identified on active medication list. I have reviewed chart for other potential  high risk medication/s and harmful drug interactions in the  "elderly.          Aspirin is on active medication list. Aspirin use is indicated based on review of current medical condition/s. Pros and cons of this therapy have been discussed today. Benefits of this medication outweigh potential harm.  Patient has been encouraged to continue taking this medication.  .      Patient Active Problem List   Diagnosis   • Hypertension     Advance Care Planning  Advance Directive is not on file.  ACP discussion was held with the patient during this visit. Patient has an advance directive (not in EMR), copy requested.          Objective    Vitals:    10/19/22 0901 10/19/22 0914   BP: 162/94 142/92   BP Location: Left arm    Patient Position: Sitting    Cuff Size: Large Adult    Pulse: 90    Resp: 16    Temp: 96.9 °F (36.1 °C)    TempSrc: Temporal    SpO2: 100%    Weight: 106 kg (234 lb)    Height: 182.9 cm (72\")      Estimated body mass index is 31.74 kg/m² as calculated from the following:    Height as of this encounter: 182.9 cm (72\").    Weight as of this encounter: 106 kg (234 lb).    BMI is >= 30 and <35. (Class 1 Obesity). The following options were offered after discussion;: exercise counseling/recommendations      Does the patient have evidence of cognitive impairment? No    Physical Exam  Vitals and nursing note reviewed.   Constitutional:       Appearance: Normal appearance.   HENT:      Head: Normocephalic and atraumatic.   Cardiovascular:      Rate and Rhythm: Normal rate. Rhythm irregular.   Pulmonary:      Effort: Pulmonary effort is normal.      Breath sounds: Normal breath sounds.   Abdominal:      General: Abdomen is flat.      Palpations: Abdomen is soft.   Musculoskeletal:         General: No swelling or deformity.      Cervical back: Neck supple.      Right lower leg: No edema.      Left lower leg: No edema.   Skin:     General: Skin is warm.      Capillary Refill: Capillary refill takes less than 2 seconds.      Findings: No rash.      Comments: Diabetic Foot Exam " Performed   Neurological:      General: No focal deficit present.      Mental Status: He is alert and oriented to person, place, and time.                 HEALTH RISK ASSESSMENT    Smoking Status:  Social History     Tobacco Use   Smoking Status Never   Smokeless Tobacco Never     Alcohol Consumption:  Social History     Substance and Sexual Activity   Alcohol Use Defer    Comment: UNKNOWN     Fall Risk Screen:    AYAKA Fall Risk Assessment was completed, and patient is at LOW risk for falls.Assessment completed on:10/19/2022    Depression Screening:  PHQ-2/PHQ-9 Depression Screening 10/19/2022   Retired PHQ-9 Total Score -   Retired Total Score -   Little Interest or Pleasure in Doing Things 0-->not at all   Feeling Down, Depressed or Hopeless 0-->not at all   PHQ-9: Brief Depression Severity Measure Score 0       Health Habits and Functional and Cognitive Screening:  Functional & Cognitive Status 10/19/2022   Do you have difficulty preparing food and eating? No   Do you have difficulty bathing yourself, getting dressed or grooming yourself? No   Do you have difficulty using the toilet? No   Do you have difficulty moving around from place to place? No   Do you have trouble with steps or getting out of a bed or a chair? No   Current Diet Unhealthy Diet   Dental Exam Up to date   Eye Exam Up to date   Exercise (times per week) 3 times per week   Current Exercises Include Walking   Do you need help using the phone?  No   Are you deaf or do you have serious difficulty hearing?  Yes   Do you need help with transportation? No   Do you need help shopping? No   Do you need help preparing meals?  No   Do you need help with housework?  No   Do you need help with laundry? No   Do you need help taking your medications? No   Do you need help managing money? No   Do you ever drive or ride in a car without wearing a seat belt? No   Have you felt unusual stress, anger or loneliness in the last month? No   Who do you live with?  Spouse   If you need help, do you have trouble finding someone available to you? No   Have you been bothered in the last four weeks by sexual problems? No   Do you have difficulty concentrating, remembering or making decisions? No       Age-appropriate Screening Schedule:  Refer to the list below for future screening recommendations based on patient's age, sex and/or medical conditions. Orders for these recommended tests are listed in the plan section. The patient has been provided with a written plan.    Health Maintenance   Topic Date Due   • TDAP/TD VACCINES (1 - Tdap) Never done   • ZOSTER VACCINE (1 of 2) Never done   • INFLUENZA VACCINE  08/01/2022   • URINE MICROALBUMIN  10/08/2022   • DIABETIC EYE EXAM  12/21/2022   • HEMOGLOBIN A1C  01/12/2023   • LIPID PANEL  07/12/2023   • DIABETIC FOOT EXAM  10/19/2023              Assessment & Plan   CMS Preventative Services Quick Reference  Risk Factors Identified During Encounter  Immunizations Discussed/Encouraged (specific Immunizations; Prevnar 20 (Pneumococcal 20-valent conjugate) and Shingrix  The above risks/problems have been discussed with the patient.  Follow up actions/plans if indicated are seen below in the Assessment/Plan Section.  Pertinent information has been shared with the patient in the After Visit Summary.    Diagnoses and all orders for this visit:    1. Routine general medical examination at a health care facility (Primary)    2. Type II diabetes mellitus with complication (HCC)  -     CBC & Differential  -     Comprehensive Metabolic Panel  -     Lipid Panel With / Chol / HDL Ratio  -     TSH  -     PSA Screen  -     Hemoglobin A1c  -     Urinalysis With Microscopic - Urine, Clean Catch    3. Essential hypertension  -     CBC & Differential  -     Comprehensive Metabolic Panel  -     Lipid Panel With / Chol / HDL Ratio  -     TSH  -     PSA Screen  -     Hemoglobin A1c  -     Urinalysis With Microscopic - Urine, Clean Catch    4. Special  screening for malignant neoplasm of prostate  -     CBC & Differential  -     Comprehensive Metabolic Panel  -     Lipid Panel With / Chol / HDL Ratio  -     TSH  -     PSA Screen  -     Hemoglobin A1c  -     Urinalysis With Microscopic - Urine, Clean Catch    5. Atrial fibrillation, unspecified type (HCC)  -     ECG 12 Lead    Other orders  -     Fluzone High-Dose 65+yrs  -     apixaban (ELIQUIS) 5 MG tablet tablet; Take 1 tablet by mouth 2 (Two) Times a Day.  Dispense: 60 tablet; Refill: 3  -     metoprolol succinate XL (Toprol XL) 25 MG 24 hr tablet; Take 1 tablet by mouth Daily.  Dispense: 30 tablet; Refill: 1        Follow Up:   Return in about 4 weeks (around 11/16/2022) for Recheck.     An After Visit Summary and PPPS were made available to the patient.          I spent 20 minutes caring for Becki on this date of service. This time includes time spent by me in the following activities:preparing for the visit, reviewing tests, obtaining and/or reviewing a separately obtained history and performing a medically appropriate examination and/or evaluation        New onset atrial fibrillation.  Patient is relatively asymptomatic.  Has not been very active.  Rate 100 today.  Due to his Samy risk score, would start Eliquis and low-dose beta-blockers for rate control.  Check echocardiogram and schedule cardiology consult.  Follow-up here in 4 weeks or sooner if problems.  Check lab work    ECG 12 Lead    Date/Time: 10/19/2022 9:44 AM  Performed by: Esau Munson MD (Tony)  Authorized by: Esau Munson MD (Tony)   Comparison: not compared with previous ECG   Rhythm: atrial fibrillation  Rate: tachycardic  ST Depression: V1-V6  T inversion: aVR  T flattening: III, aVL, aVF, V1 and V2  QRS axis: normal  Comments: Nonspecific subtle ST depression and T wave changes with atrial fibrillation

## 2022-10-19 NOTE — TELEPHONE ENCOUNTER
PATIENT STATES THAT THE ELLIQUIS WAS OVER $500 AND HE WANTS TO KNOW IF THERE IS SOMETHING SIMILAR THAT CAN BE SENT OVER.

## 2022-10-20 LAB
ALBUMIN SERPL-MCNC: 4.3 G/DL (ref 3.5–5.2)
ALBUMIN/GLOB SERPL: 1.9 G/DL
ALP SERPL-CCNC: 69 U/L (ref 39–117)
ALT SERPL-CCNC: 21 U/L (ref 1–41)
APPEARANCE UR: CLEAR
AST SERPL-CCNC: 21 U/L (ref 1–40)
BACTERIA #/AREA URNS HPF: NORMAL /HPF
BASOPHILS # BLD AUTO: 0.04 10*3/MM3 (ref 0–0.2)
BASOPHILS NFR BLD AUTO: 0.4 % (ref 0–1.5)
BILIRUB SERPL-MCNC: 0.4 MG/DL (ref 0–1.2)
BILIRUB UR QL STRIP: NEGATIVE
BUN SERPL-MCNC: 17 MG/DL (ref 8–23)
BUN/CREAT SERPL: 18.3 (ref 7–25)
CALCIUM SERPL-MCNC: 9.9 MG/DL (ref 8.6–10.5)
CASTS URNS MICRO: NORMAL
CHLORIDE SERPL-SCNC: 100 MMOL/L (ref 98–107)
CHOLEST SERPL-MCNC: 151 MG/DL (ref 0–200)
CHOLEST/HDLC SERPL: 3.51 {RATIO}
CO2 SERPL-SCNC: 25.6 MMOL/L (ref 22–29)
COLOR UR: YELLOW
CREAT SERPL-MCNC: 0.93 MG/DL (ref 0.76–1.27)
EGFRCR SERPLBLD CKD-EPI 2021: 87.2 ML/MIN/1.73
EOSINOPHIL # BLD AUTO: 0.16 10*3/MM3 (ref 0–0.4)
EOSINOPHIL NFR BLD AUTO: 1.7 % (ref 0.3–6.2)
EPI CELLS #/AREA URNS HPF: NORMAL /HPF
ERYTHROCYTE [DISTWIDTH] IN BLOOD BY AUTOMATED COUNT: 13.2 % (ref 12.3–15.4)
GLOBULIN SER CALC-MCNC: 2.3 GM/DL
GLUCOSE SERPL-MCNC: 170 MG/DL (ref 65–99)
GLUCOSE UR QL STRIP: NEGATIVE
HBA1C MFR BLD: 8.9 % (ref 4.8–5.6)
HCT VFR BLD AUTO: 42.2 % (ref 37.5–51)
HDLC SERPL-MCNC: 43 MG/DL (ref 40–60)
HGB BLD-MCNC: 13.7 G/DL (ref 13–17.7)
HGB UR QL STRIP: NEGATIVE
IMM GRANULOCYTES # BLD AUTO: 0.03 10*3/MM3 (ref 0–0.05)
IMM GRANULOCYTES NFR BLD AUTO: 0.3 % (ref 0–0.5)
KETONES UR QL STRIP: ABNORMAL
LDLC SERPL CALC-MCNC: 80 MG/DL (ref 0–100)
LEUKOCYTE ESTERASE UR QL STRIP: NEGATIVE
LYMPHOCYTES # BLD AUTO: 3.26 10*3/MM3 (ref 0.7–3.1)
LYMPHOCYTES NFR BLD AUTO: 34.3 % (ref 19.6–45.3)
MCH RBC QN AUTO: 28.7 PG (ref 26.6–33)
MCHC RBC AUTO-ENTMCNC: 32.5 G/DL (ref 31.5–35.7)
MCV RBC AUTO: 88.5 FL (ref 79–97)
MONOCYTES # BLD AUTO: 0.81 10*3/MM3 (ref 0.1–0.9)
MONOCYTES NFR BLD AUTO: 8.5 % (ref 5–12)
NEUTROPHILS # BLD AUTO: 5.2 10*3/MM3 (ref 1.7–7)
NEUTROPHILS NFR BLD AUTO: 54.8 % (ref 42.7–76)
NITRITE UR QL STRIP: NEGATIVE
NRBC BLD AUTO-RTO: 0 /100 WBC (ref 0–0.2)
PH UR STRIP: 5.5 [PH] (ref 5–8)
PLATELET # BLD AUTO: 316 10*3/MM3 (ref 140–450)
POTASSIUM SERPL-SCNC: 4 MMOL/L (ref 3.5–5.2)
PROT SERPL-MCNC: 6.6 G/DL (ref 6–8.5)
PROT UR QL STRIP: ABNORMAL
PSA SERPL-MCNC: 5.51 NG/ML (ref 0–4)
RBC # BLD AUTO: 4.77 10*6/MM3 (ref 4.14–5.8)
RBC #/AREA URNS HPF: NORMAL /HPF
SODIUM SERPL-SCNC: 140 MMOL/L (ref 136–145)
SP GR UR STRIP: 1.02 (ref 1–1.03)
TRIGL SERPL-MCNC: 160 MG/DL (ref 0–150)
TSH SERPL DL<=0.005 MIU/L-ACNC: 2.53 UIU/ML (ref 0.27–4.2)
UROBILINOGEN UR STRIP-MCNC: ABNORMAL MG/DL
VLDLC SERPL CALC-MCNC: 28 MG/DL (ref 5–40)
WBC # BLD AUTO: 9.5 10*3/MM3 (ref 3.4–10.8)
WBC #/AREA URNS HPF: NORMAL /HPF

## 2022-10-21 ENCOUNTER — TELEPHONE (OUTPATIENT)
Dept: INTERNAL MEDICINE | Facility: CLINIC | Age: 72
End: 2022-10-21

## 2022-10-21 DIAGNOSIS — I48.91 ATRIAL FIBRILLATION, UNSPECIFIED TYPE: Primary | ICD-10-CM

## 2022-10-21 NOTE — TELEPHONE ENCOUNTER
Talk to the patient about his lab work.  A1c is elevated.  He is can a work more diligently on diet and weight reduction.  If A1c is still not to goal on recheck we can add GLP inhibitor  Also the PSA is mildly elevated actually normal for his age.  We will recheck him in January.  We talked about scheduling MRI of the prostate potentially then but I think overall that is nothing big to worry about  New atrial fibrillation.  He scheduled to see cardiology and get an echocardiogram.  He did get the Xarelto picked up and will continue rate control on the beta-blocker

## 2022-11-05 RX ORDER — LOSARTAN POTASSIUM 100 MG/1
TABLET ORAL
Qty: 90 TABLET | Refills: 3 | Status: SHIPPED | OUTPATIENT
Start: 2022-11-05

## 2022-11-21 ENCOUNTER — OFFICE VISIT (OUTPATIENT)
Dept: CARDIOLOGY | Facility: CLINIC | Age: 72
End: 2022-11-21

## 2022-11-21 VITALS
WEIGHT: 232 LBS | DIASTOLIC BLOOD PRESSURE: 80 MMHG | HEIGHT: 72 IN | SYSTOLIC BLOOD PRESSURE: 170 MMHG | BODY MASS INDEX: 31.42 KG/M2 | HEART RATE: 93 BPM

## 2022-11-21 DIAGNOSIS — I48.19 ATRIAL FIBRILLATION, PERSISTENT: Primary | ICD-10-CM

## 2022-11-21 DIAGNOSIS — I10 PRIMARY HYPERTENSION: ICD-10-CM

## 2022-11-21 PROCEDURE — 99204 OFFICE O/P NEW MOD 45 MIN: CPT | Performed by: INTERNAL MEDICINE

## 2022-11-21 PROCEDURE — 93000 ELECTROCARDIOGRAM COMPLETE: CPT | Performed by: INTERNAL MEDICINE

## 2022-11-21 NOTE — PROGRESS NOTES
Subjective:     Encounter Date:11/21/22      Patient ID: Becki Cruz is a 72 y.o. male.    Chief Complaint:  History of Present Illness    Dear Dr. Munson,    I had the pleasure of seeing this patient in the office today for initial evaluation and consultation.  I appreciate that you sent him in to see us.  They come in today to be seen for evaluation of newly discovered atrial fibrillation.    Patient was in your office to be seen for a Medicare physical for his 1 year evaluation.  While there he was found to be in atrial fibrillation.  He was unaware of it.  He had noted for a month or 2 that he has been a little bit more fatigued but that was about the only sensation the potential he could relate to it.  He never felt any palpitations or tachycardia.  No presyncope or syncope.  No orthopnea or PND.    He was seen by Dr. Chicas from [id number] when he had some chest discomfort.  No abnormality was found.    He denies any chest pain or chest discomfort.  No shortness of breath.  No palpitations or tachycardia.  No orthopnea or PND.    He has not had COVID that he knows of.    He has a history of sleep apnea, diagnosed maybe 10 to 12 years ago.  I do not have those records.  He says a sleep study was mildly abnormal and he was told to sleep on his side.  He was not informed that he needed CPAP or anything else done.    The following portions of the patient's history were reviewed and updated as appropriate: allergies, current medications, past family history, past medical history, past social history, past surgical history and problem list.      ECG 12 Lead    Date/Time: 11/21/2022 12:45 PM  Performed by: Jayson Pitt III, MD  Authorized by: Jayson Pitt III, MD   Comparison: compared with previous ECG   Similar to previous ECG  Rhythm: atrial fibrillation  Rate: normal  Conduction: conduction normal  ST Segments: ST segments normal  T Waves: T waves normal  QRS axis: normal  Other: no other  "findings    Clinical impression: abnormal EKG               Objective:     Vitals:    11/21/22 1222   BP: 170/80   Pulse: 93   Weight: 105 kg (232 lb)   Height: 182.9 cm (72\")     Body mass index is 31.46 kg/m².      Constitutional:       General: Not in acute distress.     Appearance: Well-developed. Not diaphoretic.   Eyes:      General:         Right eye: No discharge.         Left eye: No discharge.      Conjunctiva/sclera: Conjunctivae normal.      Pupils: Pupils are equal, round, and reactive to light.   HENT:      Head: Normocephalic and atraumatic.      Nose: Nose normal.   Neck:      Thyroid: No thyromegaly.      Trachea: No tracheal deviation.      Lymphadenopathy: No cervical adenopathy.   Pulmonary:      Effort: Pulmonary effort is normal. No respiratory distress.      Breath sounds: Normal breath sounds. No stridor.   Chest:      Chest wall: Not tender to palpatation.   Cardiovascular:      Normal rate. Irregularly irregular rhythm.      . No S3 gallop.   Abdominal:      General: Bowel sounds are normal. There is no distension.      Palpations: Abdomen is soft. There is no abdominal mass.      Tenderness: There is no abdominal tenderness. There is no guarding or rebound.   Musculoskeletal: Normal range of motion.         General: No tenderness or deformity.      Cervical back: Normal range of motion and neck supple. Skin:     General: Skin is warm and dry.      Findings: No erythema or rash.   Neurological:      Mental Status: Alert and oriented to person, place, and time.      Deep Tendon Reflexes: Reflexes are normal and symmetric.   Psychiatric:         Thought Content: Thought content normal.         Data and records reviewed:     Lab Results   Component Value Date    GLUCOSE 170 (H) 10/19/2022    BUN 17 10/19/2022    CREATININE 0.93 10/19/2022    EGFRIFNONA 74 01/12/2022    EGFRIFAFRI 85 01/12/2022    BCR 18.3 10/19/2022    K 4.0 10/19/2022    CO2 25.6 10/19/2022    CALCIUM 9.9 10/19/2022    " PROTENTOTREF 6.6 10/19/2022    ALBUMIN 4.30 10/19/2022    LABIL2 1.9 10/19/2022    AST 21 10/19/2022    ALT 21 10/19/2022     No results found for: CHOL  Lab Results   Component Value Date    TRIG 160 (H) 10/19/2022    TRIG 134 07/12/2022    TRIG 184 (H) 01/12/2022     Lab Results   Component Value Date    HDL 43 10/19/2022    HDL 41 07/12/2022    HDL 41 01/12/2022     Lab Results   Component Value Date    LDL 80 10/19/2022    LDL 92 07/12/2022    LDL 73 01/12/2022     Lab Results   Component Value Date    VLDL 28 10/19/2022    VLDL 24 07/12/2022    VLDL 31 01/12/2022     No results found for: LDLHDL  CBC    CBC 10/19/22   WBC 9.50   RBC 4.77   Hemoglobin 13.7   Hematocrit 42.2   MCV 88.5   MCH 28.7   MCHC 32.5   RDW 13.2   Platelets 316           No radiology results for the last 90 days.          Assessment:          Diagnosis Plan   1. Atrial fibrillation, persistent (HCC)  ECG 12 Lead    Holter Monitor - 24 Hour    Adult Transthoracic Echo Complete W/ Cont if Necessary Per Protocol    Ambulatory Referral to Sleep Lab      2. Primary hypertension  ECG 12 Lead    Holter Monitor - 24 Hour    Adult Transthoracic Echo Complete W/ Cont if Necessary Per Protocol    Ambulatory Referral to Sleep Lab             Plan:       1.  Persistent atrial fibrillation-we will obtain a Holter monitor and echocardiogram, patient is currently on metoprolol and rivaroxaban  2.  Sleep apnea-prior history of sleep apnea, previously was not felt to require CPAP or other therapy.  He has been maybe 10 or 12 years since he got checked, we will arrange for him to get seen again by sleep medicine  3.  Primary hypertension on amlodipine and losartan.  Patient is also on hydrochlorothiazide.  Creatinine, BUN reviewed.    Thank you very much for allowing us to participate in the care of this pleasant patient.  Please don't hesitate to call if I can be of assistance in any way.      Current Outpatient Medications:   •  amLODIPine (NORVASC) 10 MG  tablet, TAKE ONE TABLET BY MOUTH DAILY, Disp: 90 tablet, Rfl: 1  •  glipizide (GLUCOTROL XL) 10 MG 24 hr tablet, Take 1 tablet by mouth Daily., Disp: 90 tablet, Rfl: 4  •  hydroCHLOROthiazide (HYDRODIURIL) 25 MG tablet, TAKE ONE TABLET BY MOUTH DAILY, Disp: 90 tablet, Rfl: 1  •  Latanoprost 0.005 % emulsion, Apply  to eye(s) as directed by provider., Disp: , Rfl:   •  losartan (COZAAR) 100 MG tablet, TAKE ONE TABLET BY MOUTH DAILY, Disp: 90 tablet, Rfl: 3  •  metFORMIN (GLUCOPHAGE) 1000 MG tablet, TAKE ONE TABLET BY MOUTH TWICE A DAY WITH MEALS, Disp: 180 tablet, Rfl: 2  •  metoprolol succinate XL (Toprol XL) 25 MG 24 hr tablet, Take 1 tablet by mouth Daily., Disp: 30 tablet, Rfl: 1  •  rivaroxaban (Xarelto) 20 MG tablet, Take 1 tablet by mouth Daily With Dinner., Disp: 30 tablet, Rfl: 2  •  Saw Palmetto 450 MG capsule, Take 450 mg by mouth., Disp: , Rfl:   •  simvastatin (ZOCOR) 40 MG tablet, TAKE 1 TABLET DAILY IN THE EVENING, Disp: 90 tablet, Rfl: 2         No follow-ups on file.

## 2022-11-28 RX ORDER — AMLODIPINE BESYLATE 10 MG/1
TABLET ORAL
Qty: 90 TABLET | Refills: 1 | Status: SHIPPED | OUTPATIENT
Start: 2022-11-28

## 2022-11-28 RX ORDER — HYDROCHLOROTHIAZIDE 25 MG/1
TABLET ORAL
Qty: 90 TABLET | Refills: 1 | Status: SHIPPED | OUTPATIENT
Start: 2022-11-28

## 2022-12-07 ENCOUNTER — HOSPITAL ENCOUNTER (OUTPATIENT)
Dept: CARDIOLOGY | Facility: HOSPITAL | Age: 72
Discharge: HOME | End: 2022-12-07
Admitting: INTERNAL MEDICINE

## 2022-12-07 VITALS
HEART RATE: 68 BPM | BODY MASS INDEX: 31.35 KG/M2 | DIASTOLIC BLOOD PRESSURE: 80 MMHG | WEIGHT: 231.48 LBS | HEIGHT: 72 IN | SYSTOLIC BLOOD PRESSURE: 140 MMHG

## 2022-12-07 DIAGNOSIS — I48.19 ATRIAL FIBRILLATION, PERSISTENT: ICD-10-CM

## 2022-12-07 DIAGNOSIS — I10 PRIMARY HYPERTENSION: ICD-10-CM

## 2022-12-07 LAB
AORTIC ARCH: 2.9 CM
ASCENDING AORTA: 3.2 CM
BH CV ECHO LEFT ATRIAL STRAIN: -20.3 %
BH CV ECHO MEAS - ACS: 2.19 CM
BH CV ECHO MEAS - AO MAX PG: 7.4 MMHG
BH CV ECHO MEAS - AO MEAN PG: 4.4 MMHG
BH CV ECHO MEAS - AO ROOT DIAM: 3 CM
BH CV ECHO MEAS - AO V2 MAX: 136.3 CM/SEC
BH CV ECHO MEAS - AO V2 VTI: 26.5 CM
BH CV ECHO MEAS - AVA(I,D): 2.9 CM2
BH CV ECHO MEAS - EDV(CUBED): 113.9 ML
BH CV ECHO MEAS - EDV(MOD-SP2): 91 ML
BH CV ECHO MEAS - EDV(MOD-SP4): 121 ML
BH CV ECHO MEAS - EF(MOD-BP): 61.8 %
BH CV ECHO MEAS - EF(MOD-SP2): 59.3 %
BH CV ECHO MEAS - EF(MOD-SP4): 62.8 %
BH CV ECHO MEAS - ESV(CUBED): 37 ML
BH CV ECHO MEAS - ESV(MOD-SP2): 37 ML
BH CV ECHO MEAS - ESV(MOD-SP4): 45 ML
BH CV ECHO MEAS - FS: 31.2 %
BH CV ECHO MEAS - IVS/LVPW: 1.13 CM
BH CV ECHO MEAS - IVSD: 0.86 CM
BH CV ECHO MEAS - LAT PEAK E' VEL: 12 CM/SEC
BH CV ECHO MEAS - LV MASS(C)D: 130.9 GRAMS
BH CV ECHO MEAS - LV MAX PG: 5.2 MMHG
BH CV ECHO MEAS - LV MEAN PG: 2.29 MMHG
BH CV ECHO MEAS - LV V1 MAX: 113.8 CM/SEC
BH CV ECHO MEAS - LV V1 VTI: 21.5 CM
BH CV ECHO MEAS - LVIDD: 4.8 CM
BH CV ECHO MEAS - LVIDS: 3.3 CM
BH CV ECHO MEAS - LVOT AREA: 3.6 CM2
BH CV ECHO MEAS - LVOT DIAM: 2.13 CM
BH CV ECHO MEAS - LVPWD: 0.76 CM
BH CV ECHO MEAS - MED PEAK E' VEL: 10.2 CM/SEC
BH CV ECHO MEAS - MV DEC SLOPE: 458.6 CM/SEC2
BH CV ECHO MEAS - MV DEC TIME: 0.19 MSEC
BH CV ECHO MEAS - MV E MAX VEL: 93.8 CM/SEC
BH CV ECHO MEAS - MV MAX PG: 4.2 MMHG
BH CV ECHO MEAS - MV MEAN PG: 1.74 MMHG
BH CV ECHO MEAS - MV P1/2T: 68.6 MSEC
BH CV ECHO MEAS - MV V2 VTI: 33.9 CM
BH CV ECHO MEAS - MVA(P1/2T): 3.2 CM2
BH CV ECHO MEAS - MVA(VTI): 2.26 CM2
BH CV ECHO MEAS - PA ACC TIME: 0.06 SEC
BH CV ECHO MEAS - PA PR(ACCEL): 50.5 MMHG
BH CV ECHO MEAS - PA V2 MAX: 97.7 CM/SEC
BH CV ECHO MEAS - QP/QS: 1
BH CV ECHO MEAS - RAP SYSTOLE: 8 MMHG
BH CV ECHO MEAS - RV MAX PG: 2.43 MMHG
BH CV ECHO MEAS - RV V1 MAX: 78 CM/SEC
BH CV ECHO MEAS - RV V1 VTI: 16.1 CM
BH CV ECHO MEAS - RVOT DIAM: 2.46 CM
BH CV ECHO MEAS - RVSP: 33 MMHG
BH CV ECHO MEAS - SUP REN AO DIAM: 3.2 CM
BH CV ECHO MEAS - SV(LVOT): 76.5 ML
BH CV ECHO MEAS - SV(MOD-SP2): 54 ML
BH CV ECHO MEAS - SV(MOD-SP4): 76 ML
BH CV ECHO MEAS - SV(RVOT): 76.4 ML
BH CV ECHO MEAS - TAPSE (>1.6): 1.69 CM
BH CV ECHO MEAS - TR MAX PG: 24.5 MMHG
BH CV ECHO MEAS - TR MAX VEL: 247.3 CM/SEC
BH CV ECHO MEASUREMENTS AVERAGE E/E' RATIO: 8.45
BH CV XLRA - RV BASE: 3.2 CM
BH CV XLRA - RV LENGTH: 7.8 CM
BH CV XLRA - RV MID: 2.9 CM
BH CV XLRA - TDI S': 9.8 CM/SEC
LEFT ATRIUM VOLUME INDEX: 83.3 ML/M2
MAXIMAL PREDICTED HEART RATE: 148 BPM
SINUS: 3.5 CM
STJ: 2.9 CM
STRESS TARGET HR: 126 BPM

## 2022-12-07 PROCEDURE — 93306 TTE W/DOPPLER COMPLETE: CPT | Performed by: INTERNAL MEDICINE

## 2022-12-07 PROCEDURE — 93306 TTE W/DOPPLER COMPLETE: CPT

## 2022-12-08 NOTE — PROGRESS NOTES
Reviewed results with Bekci rCuz.  Patient's questions were answered and he verbalized understanding of results.    Thank you,  Delia Anderson RN  Triage Nurse [facility] [place]

## 2022-12-13 ENCOUNTER — TELEPHONE (OUTPATIENT)
Dept: CARDIOLOGY | Facility: CLINIC | Age: 72
End: 2022-12-13

## 2022-12-13 NOTE — TELEPHONE ENCOUNTER
I called and spoke with pt.  He is going to see you for follow up 12/19 at EP office.  He has an appt to see Dr. Vallejo, sleep medicine, on 3/6/23.    If there is anything else I can do please let me know.    Lucretia Mccarty RN  [facility] Triage  12/13/22 09:46 EST      ----- Message from Jayson Pitt III, MD sent at 12/12/2022  4:45 PM EST -----  Please call patient, let him know that the monitor did show persistent atrial fibrillation, lets have him come in for a follow-up appointment in the office to discuss next steps..  Also, find out when he has an appt to see sleep medicine

## 2022-12-14 RX ORDER — METOPROLOL SUCCINATE 25 MG/1
TABLET, EXTENDED RELEASE ORAL
Qty: 30 TABLET | Refills: 1 | Status: SHIPPED | OUTPATIENT
Start: 2022-12-14 | End: 2023-02-08

## 2022-12-19 ENCOUNTER — OFFICE VISIT (OUTPATIENT)
Dept: CARDIOLOGY | Facility: CLINIC | Age: 72
End: 2022-12-19

## 2022-12-19 VITALS
WEIGHT: 232 LBS | HEART RATE: 91 BPM | HEIGHT: 72 IN | SYSTOLIC BLOOD PRESSURE: 110 MMHG | DIASTOLIC BLOOD PRESSURE: 74 MMHG | BODY MASS INDEX: 31.42 KG/M2

## 2022-12-19 DIAGNOSIS — I10 PRIMARY HYPERTENSION: ICD-10-CM

## 2022-12-19 DIAGNOSIS — G47.30 SLEEP APNEA, UNSPECIFIED TYPE: ICD-10-CM

## 2022-12-19 DIAGNOSIS — I48.19 ATRIAL FIBRILLATION, PERSISTENT: Primary | ICD-10-CM

## 2022-12-19 PROCEDURE — 99214 OFFICE O/P EST MOD 30 MIN: CPT | Performed by: INTERNAL MEDICINE

## 2022-12-19 RX ORDER — MULTIPLE VITAMINS W/ MINERALS TAB 9MG-400MCG
1 TAB ORAL DAILY
COMMUNITY

## 2022-12-19 RX ORDER — AMOXICILLIN 500 MG/1
TABLET, FILM COATED ORAL
COMMUNITY
Start: 2022-12-14

## 2022-12-19 NOTE — PROGRESS NOTES
"    Subjective:     Encounter Date:12/19/22      Patient ID: Becki Cruz is a 72 y.o. male.    Chief Complaint:  History of Present Illness    Dear Dr. Munson,    I had the pleasure of seeing this patient in the office today for follow-up of his atrial fibrillation.    We had him wear a Holter monitor that showed persistent atrial fibrillation.  Echocardiogram shows normal left atrial dimensions and normal function.    I am very concerned about him having sleep apnea and he is as well.  We referred him to sleep medicine but he does not have an appointment till March and he was told this was the earliest they can get him in.    No chest pain or chest discomfort, no shortness of breath.    Patient was in your office to be seen for a Medicare physical for his 1 year evaluation.  While there he was found to be in atrial fibrillation.  He was unaware of it.  He had noted for a month or 2 that he has been a little bit more fatigued but that was about the only sensation the potential he could relate to it.  He never felt any palpitations or tachycardia.  No presyncope or syncope.  No orthopnea or PND.    He was seen by Dr. Chicas from [id number] when he had some chest discomfort.  No abnormality was found.    He has a history of sleep apnea, diagnosed maybe 10 to 12 years ago.  I do not have those records.  He says a sleep study was mildly abnormal and he was told to sleep on his side.  He was not informed that he needed CPAP or anything else done.    The following portions of the patient's history were reviewed and updated as appropriate: allergies, current medications, past family history, past medical history, past social history, past surgical history and problem list.    Procedures       Objective:     Vitals:    12/19/22 1450   BP: 110/74   Pulse: 91   Weight: 105 kg (232 lb)   Height: 182.9 cm (72\")     Body mass index is 31.46 kg/m².      Constitutional:       General: Not in acute distress.     Appearance: " Well-developed. Not diaphoretic.   Eyes:      General:         Right eye: No discharge.         Left eye: No discharge.      Conjunctiva/sclera: Conjunctivae normal.      Pupils: Pupils are equal, round, and reactive to light.   HENT:      Head: Normocephalic and atraumatic.      Nose: Nose normal.   Neck:      Thyroid: No thyromegaly.      Trachea: No tracheal deviation.      Lymphadenopathy: No cervical adenopathy.   Pulmonary:      Effort: Pulmonary effort is normal. No respiratory distress.      Breath sounds: Normal breath sounds. No stridor.   Chest:      Chest wall: Not tender to palpatation.   Cardiovascular:      Normal rate. Irregularly irregular rhythm.      . No S3 gallop.   Abdominal:      General: Bowel sounds are normal. There is no distension.      Palpations: Abdomen is soft. There is no abdominal mass.      Tenderness: There is no abdominal tenderness. There is no guarding or rebound.   Musculoskeletal: Normal range of motion.         General: No tenderness or deformity.      Cervical back: Normal range of motion and neck supple. Skin:     General: Skin is warm and dry.      Findings: No erythema or rash.   Neurological:      Mental Status: Alert and oriented to person, place, and time.      Deep Tendon Reflexes: Reflexes are normal and symmetric.   Psychiatric:         Thought Content: Thought content normal.         Data and records reviewed:     Lab Results   Component Value Date    GLUCOSE 170 (H) 10/19/2022    BUN 17 10/19/2022    CREATININE 0.93 10/19/2022    EGFRIFNONA 74 01/12/2022    EGFRIFAFRI 85 01/12/2022    BCR 18.3 10/19/2022    K 4.0 10/19/2022    CO2 25.6 10/19/2022    CALCIUM 9.9 10/19/2022    PROTENTOTREF 6.6 10/19/2022    ALBUMIN 4.30 10/19/2022    LABIL2 1.9 10/19/2022    AST 21 10/19/2022    ALT 21 10/19/2022     No results found for: CHOL  Lab Results   Component Value Date    TRIG 160 (H) 10/19/2022    TRIG 134 07/12/2022    TRIG 184 (H) 01/12/2022     Lab Results   Component  Value Date    HDL 43 10/19/2022    HDL 41 07/12/2022    HDL 41 01/12/2022     Lab Results   Component Value Date    LDL 80 10/19/2022    LDL 92 07/12/2022    LDL 73 01/12/2022     Lab Results   Component Value Date    VLDL 28 10/19/2022    VLDL 24 07/12/2022    VLDL 31 01/12/2022     No results found for: LDLHDL  CBC    CBC 10/19/22   WBC 9.50   RBC 4.77   Hemoglobin 13.7   Hematocrit 42.2   MCV 88.5   MCH 28.7   MCHC 32.5   RDW 13.2   Platelets 316           No radiology results for the last 90 days.  Results for orders placed during the hospital encounter of 12/07/22    Adult Transthoracic Echo Complete W/ Cont if Necessary Per Protocol    Interpretation Summary  •  Left ventricular ejection fraction appears to be 61 - 65%.  •  Left ventricular diastolic function was indeterminate.  •  Estimated right ventricular systolic pressure from tricuspid regurgitation is normal (<35 mmHg).          Assessment:         No diagnosis found.       Plan:       1.  Persistent atrial fibrillation-we will obtain a Holter monitor and echocardiogram, patient is currently on metoprolol and rivaroxaban.  I do not want to consider a rhythm control strategy until we assess if he has sleep apnea and if so have that addressed.  2.  Sleep apnea-prior history of sleep apnea, previously was not felt to require CPAP or other therapy.  He has been maybe 10 or 12 years since he got checked, I had ordered him to be seen by sleep medicine when I saw him last month.  He is got an appointment but not until March 2023, I called over to sleep medicine and they stated that they were full and they could not work him in sooner.  I accordingly have ordered a home sleep study.  3.  Primary hypertension on amlodipine and losartan.  Patient is also on hydrochlorothiazide.  Creatinine, BUN reviewed.    Thank you very much for allowing us to participate in the care of this pleasant patient.  Please don't hesitate to call if I can be of assistance in any  way.      Current Outpatient Medications:   •  amLODIPine (NORVASC) 10 MG tablet, TAKE ONE TABLET BY MOUTH DAILY, Disp: 90 tablet, Rfl: 1  •  amoxicillin (AMOXIL) 500 MG tablet, Take 4 tablets by mouth 1 hr prior to dental proceure, Disp: , Rfl:   •  glipizide (GLUCOTROL XL) 10 MG 24 hr tablet, Take 1 tablet by mouth Daily., Disp: 90 tablet, Rfl: 4  •  hydroCHLOROthiazide (HYDRODIURIL) 25 MG tablet, TAKE ONE TABLET BY MOUTH DAILY, Disp: 90 tablet, Rfl: 1  •  Latanoprost 0.005 % emulsion, Apply  to eye(s) as directed by provider., Disp: , Rfl:   •  losartan (COZAAR) 100 MG tablet, TAKE ONE TABLET BY MOUTH DAILY, Disp: 90 tablet, Rfl: 3  •  metFORMIN (GLUCOPHAGE) 1000 MG tablet, TAKE ONE TABLET BY MOUTH TWICE A DAY WITH MEALS, Disp: 180 tablet, Rfl: 2  •  metoprolol succinate XL (TOPROL-XL) 25 MG 24 hr tablet, TAKE ONE TABLET BY MOUTH DAILY, Disp: 30 tablet, Rfl: 1  •  multivitamin with minerals (MULTIVITAMIN ADULT PO), Take 1 tablet by mouth Daily., Disp: , Rfl:   •  rivaroxaban (Xarelto) 20 MG tablet, Take 1 tablet by mouth Daily With Dinner., Disp: 30 tablet, Rfl: 2  •  Saw Palmetto 450 MG capsule, Take 450 mg by mouth., Disp: , Rfl:   •  simvastatin (ZOCOR) 40 MG tablet, TAKE 1 TABLET DAILY IN THE EVENING, Disp: 90 tablet, Rfl: 2         No follow-ups on file.

## 2022-12-20 ENCOUNTER — TELEPHONE (OUTPATIENT)
Dept: CARDIOLOGY | Facility: CLINIC | Age: 72
End: 2022-12-20

## 2022-12-20 NOTE — TELEPHONE ENCOUNTER
----- Message from Jayson Pitt III, MD sent at 12/20/2022  1:13 PM EST -----  Please call patient, I am not able to get him in earlier at sleep medicine, as we discussed yesterday I have ordered a home sleep study

## 2023-01-10 ENCOUNTER — HOSPITAL ENCOUNTER (OUTPATIENT)
Dept: SLEEP MEDICINE | Facility: HOSPITAL | Age: 73
Discharge: HOME | End: 2023-01-10
Admitting: INTERNAL MEDICINE
Payer: MEDICARE

## 2023-01-10 DIAGNOSIS — I10 PRIMARY HYPERTENSION: ICD-10-CM

## 2023-01-10 DIAGNOSIS — G47.30 SLEEP APNEA, UNSPECIFIED TYPE: ICD-10-CM

## 2023-01-10 DIAGNOSIS — I48.19 ATRIAL FIBRILLATION, PERSISTENT: ICD-10-CM

## 2023-01-10 PROCEDURE — 95806 SLEEP STUDY UNATT&RESP EFFT: CPT

## 2023-01-10 PROCEDURE — 95806 SLEEP STUDY UNATT&RESP EFFT: CPT | Performed by: FAMILY MEDICINE

## 2023-01-15 RX ORDER — RIVAROXABAN 20 MG/1
TABLET, FILM COATED ORAL
Qty: 30 TABLET | Refills: 2 | Status: SHIPPED | OUTPATIENT
Start: 2023-01-15 | End: 2023-01-23 | Stop reason: ALTCHOICE

## 2023-01-18 ENCOUNTER — TELEPHONE (OUTPATIENT)
Dept: SLEEP MEDICINE | Facility: HOSPITAL | Age: 73
End: 2023-01-18
Payer: MEDICARE

## 2023-01-18 NOTE — TELEPHONE ENCOUNTER
Called patient with HST results. No answer, left message to call back for results. Sending order for CPAP to [facility].

## 2023-01-23 ENCOUNTER — OFFICE VISIT (OUTPATIENT)
Dept: INTERNAL MEDICINE | Facility: CLINIC | Age: 73
End: 2023-01-23
Payer: MEDICARE

## 2023-01-23 VITALS
RESPIRATION RATE: 16 BRPM | TEMPERATURE: 96.2 F | HEART RATE: 74 BPM | HEIGHT: 72 IN | WEIGHT: 230 LBS | SYSTOLIC BLOOD PRESSURE: 146 MMHG | DIASTOLIC BLOOD PRESSURE: 88 MMHG | BODY MASS INDEX: 31.15 KG/M2 | OXYGEN SATURATION: 99 %

## 2023-01-23 DIAGNOSIS — E11.8 TYPE II DIABETES MELLITUS WITH COMPLICATION: Primary | ICD-10-CM

## 2023-01-23 DIAGNOSIS — G47.30 SLEEP APNEA, UNSPECIFIED TYPE: ICD-10-CM

## 2023-01-23 DIAGNOSIS — I48.19 ATRIAL FIBRILLATION, PERSISTENT: ICD-10-CM

## 2023-01-23 PROCEDURE — 99214 OFFICE O/P EST MOD 30 MIN: CPT | Performed by: INTERNAL MEDICINE

## 2023-01-23 NOTE — PROGRESS NOTES
"Chief Complaint  Hypertension, Hyperlipidemia, and Diabetes    Subjective        Becki Cruz presents to [facility]  History of Present Illness  Blood sugar has reportedly been ?.  No polyuria or polydipsia or visual changes.  No chest pains or shortness of breath.  Taking medication as prescribed.  No side effects reported.  His Xarelto 1 up to $600 a month and was wondering about alternatives.  Cardiology started him on that.  Also had a sleep study looks like he has mild sleep apnea but has not heard back from sleep medicine about CPAP yet  Objective   Vital Signs:  /88 (BP Location: Left arm, Patient Position: Sitting, Cuff Size: Large Adult)   Pulse 74   Temp 96.2 °F (35.7 °C) (Temporal)   Resp 16   Ht 182.9 cm (72\")   Wt 104 kg (230 lb)   SpO2 99%   BMI 31.19 kg/m²   Estimated body mass index is 31.19 kg/m² as calculated from the following:    Height as of this encounter: 182.9 cm (72\").    Weight as of this encounter: 104 kg (230 lb).             Physical Exam  Vitals and nursing note reviewed.   Constitutional:       Appearance: Normal appearance.   HENT:      Head: Normocephalic and atraumatic.   Cardiovascular:      Rate and Rhythm: Normal rate. Rhythm irregular.   Pulmonary:      Effort: Pulmonary effort is normal.      Breath sounds: Normal breath sounds.   Abdominal:      General: Abdomen is flat.      Palpations: Abdomen is soft.   Musculoskeletal:         General: No swelling or deformity.      Cervical back: Neck supple.      Right lower leg: No edema.      Left lower leg: No edema.   Skin:     General: Skin is warm.      Capillary Refill: Capillary refill takes less than 2 seconds.      Findings: No rash.   Neurological:      General: No focal deficit present.      Mental Status: He is alert and oriented to person, place, and time.        Result Review :        Previous labs reviewed           Assessment and Plan   Diagnoses and all " orders for this visit:    1. Type II diabetes mellitus with complication (HCC) (Primary)    2. Atrial fibrillation, persistent (HCC)    3. Sleep apnea, unspecified type    Other orders  -     apixaban (ELIQUIS) 5 MG tablet tablet; Take 1 tablet by mouth 2 (Two) Times a Day.  Dispense: 60 tablet; Refill: 12    Type 2 diabetes with suboptimal control.  Recommend checking the blood sugar at home.  He does have a monitor, he thinks the battery is dead though.  If so or not functional we can send in a new prescription  Chronic atrial fibrillation.  On Xarelto but very cost prohibitive.  Sent in Eliquis which may not be any better.  We talked about Coumadin but recommend he check with his insurance then benefits first.  Consider mail to [facility] for Eliquis or Xarelto much cheaper.  Sleep apnea mild on sleep study with some mild desaturation.  I suspect will recommend CPAP per sleep medicine.  Imagine he should hear from them soon if not recommend calling them back         Follow Up   Return in about 3 months (around 4/23/2023).  Patient was given instructions and counseling regarding his condition or for health maintenance advice. Please see specific information pulled into the AVS if appropriate.

## 2023-01-24 LAB
ALBUMIN SERPL-MCNC: 4.4 G/DL (ref 3.7–4.7)
ALBUMIN/GLOB SERPL: 1.5 {RATIO} (ref 1.2–2.2)
ALP SERPL-CCNC: 68 IU/L (ref 44–121)
ALT SERPL-CCNC: 15 IU/L (ref 0–44)
AST SERPL-CCNC: 16 IU/L (ref 0–40)
BILIRUB SERPL-MCNC: 0.4 MG/DL (ref 0–1.2)
BUN SERPL-MCNC: 17 MG/DL (ref 8–27)
BUN/CREAT SERPL: 18 (ref 10–24)
CALCIUM SERPL-MCNC: 9.7 MG/DL (ref 8.6–10.2)
CHLORIDE SERPL-SCNC: 102 MMOL/L (ref 96–106)
CHOLEST SERPL-MCNC: 122 MG/DL (ref 100–199)
CHOLEST/HDLC SERPL: 3.2 RATIO (ref 0–5)
CO2 SERPL-SCNC: 24 MMOL/L (ref 20–29)
CREAT SERPL-MCNC: 0.92 MG/DL (ref 0.76–1.27)
CREAT UR-MCNC: NORMAL MG/DL
EGFRCR SERPLBLD CKD-EPI 2021: 88 ML/MIN/1.73
GLOBULIN SER CALC-MCNC: 2.9 G/DL (ref 1.5–4.5)
GLUCOSE SERPL-MCNC: 158 MG/DL (ref 70–99)
HBA1C MFR BLD: 8.5 % (ref 4.8–5.6)
HDLC SERPL-MCNC: 38 MG/DL
LDLC SERPL CALC-MCNC: 64 MG/DL (ref 0–99)
MICROALBUMIN UR-MCNC: NORMAL
POTASSIUM SERPL-SCNC: 4 MMOL/L (ref 3.5–5.2)
PROT SERPL-MCNC: 7.3 G/DL (ref 6–8.5)
REQUEST PROBLEM: NORMAL
SODIUM SERPL-SCNC: 143 MMOL/L (ref 134–144)
TRIGL SERPL-MCNC: 111 MG/DL (ref 0–149)
TSH SERPL DL<=0.005 MIU/L-ACNC: 2.73 UIU/ML (ref 0.45–4.5)
VLDLC SERPL CALC-MCNC: 20 MG/DL (ref 5–40)

## 2023-02-08 RX ORDER — METOPROLOL SUCCINATE 25 MG/1
TABLET, EXTENDED RELEASE ORAL
Qty: 90 TABLET | Refills: 4 | Status: SHIPPED | OUTPATIENT
Start: 2023-02-08

## 2023-02-12 RX ORDER — RIVAROXABAN 20 MG/1
TABLET, FILM COATED ORAL
Qty: 30 TABLET | Refills: 12 | Status: SHIPPED | OUTPATIENT
Start: 2023-02-12

## 2023-03-07 RX ORDER — SIMVASTATIN 40 MG
TABLET ORAL
Qty: 90 TABLET | Refills: 2 | Status: SHIPPED | OUTPATIENT
Start: 2023-03-07

## 2023-03-22 ENCOUNTER — OFFICE VISIT (OUTPATIENT)
Dept: SLEEP MEDICINE | Facility: HOSPITAL | Age: 73
End: 2023-03-22
Payer: MEDICARE

## 2023-03-22 VITALS
HEIGHT: 72 IN | SYSTOLIC BLOOD PRESSURE: 138 MMHG | DIASTOLIC BLOOD PRESSURE: 72 MMHG | WEIGHT: 229 LBS | OXYGEN SATURATION: 97 % | HEART RATE: 86 BPM | BODY MASS INDEX: 31.02 KG/M2

## 2023-03-22 DIAGNOSIS — G47.33 OSA (OBSTRUCTIVE SLEEP APNEA): Primary | ICD-10-CM

## 2023-03-22 DIAGNOSIS — I10 PRIMARY HYPERTENSION: ICD-10-CM

## 2023-03-22 DIAGNOSIS — I48.19 ATRIAL FIBRILLATION, PERSISTENT: ICD-10-CM

## 2023-03-22 PROCEDURE — G0463 HOSPITAL OUTPT CLINIC VISIT: HCPCS

## 2023-03-22 NOTE — PROGRESS NOTES
[facility]  Telephone: [phone] / Fax: [phone] [place]  Telephone: [phone] / Fax: [phone] [place]    Referring Physician: Esau Munson MD (Tony)  PCP: Esau Munson MD (Tony)    Reason for consult:  sleep apnea    Becki Cruz is a 72 y.o.male  was seen in the Sleep Disorders Center today for evaluation of sleep apnea.   He got a CPAP after HST confirming mild CAROL. He c/o dry mouth. He uses nasal mask. It fits well  He is not a mouth breather. He is unaware of snoring /gasping respirations while on the machine.  He sleeps from 11pm-7am. ESS is 4.  Subjectively, he feels much better since he started CPAP  Machine controls the sleepiness and the snoring. Machine is set at 6-15cm [id number] settings    SH-2 bottles caffeine per day, no energy drinks    ROS negative.      Becki Cruz  has a past medical history of Abnormal ECG (10-), Actinic keratosis, Atrial fibrillation, persistent (HCC) (11/21/2022), Cellulitis, unspecified, Diabetic eye exam (HCC), Essential (primary) hypertension, Laceration without foreign body, right lower leg, initial encounter, Mixed hyperlipidemia, Osteoarthritis of first carpometacarpal joint, unspecified, Seborrheic keratosis, Sleep apnea (2010), Type 2 diabetes mellitus without complication (HCC), and Underimmunization status.    Current Medications:    Current Outpatient Medications:   •  amLODIPine (NORVASC) 10 MG tablet, TAKE ONE TABLET BY MOUTH DAILY, Disp: 90 tablet, Rfl: 1  •  amoxicillin (AMOXIL) 500 MG tablet, Take 4 tablets by mouth 1 hr prior to dental proceure, Disp: , Rfl:   •  glipizide (GLUCOTROL XL) 10 MG 24 hr tablet, Take 1 tablet by mouth Daily., Disp: 90 tablet, Rfl: 4  •  hydroCHLOROthiazide (HYDRODIURIL) 25 MG tablet, TAKE ONE TABLET BY MOUTH DAILY, Disp: 90 tablet, Rfl: 1  •  Latanoprost 0.005 % emulsion, Apply  to eye(s) as directed by provider., Disp: , Rfl:   •  losartan (COZAAR)  "100 MG tablet, TAKE ONE TABLET BY MOUTH DAILY, Disp: 90 tablet, Rfl: 3  •  metFORMIN (GLUCOPHAGE) 1000 MG tablet, TAKE ONE TABLET BY MOUTH TWICE A DAY WITH MEALS, Disp: 180 tablet, Rfl: 2  •  metoprolol succinate XL (TOPROL-XL) 25 MG 24 hr tablet, TAKE ONE TABLET BY MOUTH DAILY, Disp: 90 tablet, Rfl: 4  •  multivitamin with minerals tablet tablet, Take 1 tablet by mouth Daily., Disp: , Rfl:   •  Saw Palmetto 450 MG capsule, Take 450 mg by mouth., Disp: , Rfl:   •  simvastatin (ZOCOR) 40 MG tablet, TAKE ONE TABLET BY MOUTH ONCE NIGHTLY, Disp: 90 tablet, Rfl: 2  •  Xarelto 20 MG tablet, TAKE ONE TABLET BY MOUTH DAILY WITH DINNER, Disp: 30 tablet, Rfl: 12    I have reviewed Past Medical History, Past Surgical History, Medication List, Social History and Family History as entered in Sleep Questionnaire and EPIC.    ESS  4   Vital Signs /72   Pulse 86   Ht 182.9 cm (72\")   Wt 104 kg (229 lb)   SpO2 97%   BMI 31.06 kg/m²  Body mass index is 31.06 kg/m².    General Alert and oriented. No acute distress noted   Pharynx/Throat Class IV  Mallampati airway, large tongue, no evidence of redundant lateral pharyngeal tissue. No oral lesions. No thrush. Moist mucous membranes.   Head Normocephalic. Symmetrical. Atraumatic.    Nose No septal deviation. No drainage   Chest Wall Normal shape. Symmetric expansion with respiration. No tenderness.   Neck Trachea midline, no thyromegaly or adenopathy    Lungs Clear to auscultation bilaterally. No wheezes. No rhonchi. No rales. Respirations regular, even and unlabored.   Heart Regular rhythm and normal rate. Normal S1 and S2. No murmur   Abdomen Soft, non-tender and non-distended. Normal bowel sounds. No masses.   Extremities Moves all extremities well. No edema   Psychiatric Normal mood and affect.       [facility] Jan 2023-Home Sleep Study Findings:   Recording start time 10:11 PM and recording end time 7:37 AM. Total recording time 566.6 minutes and monitoring time 563.1 minutes. " "The patient had 2 obstructive events and 84 partial obstructive events. AHI for total sleep time is 9.2 Lowest oxygen saturation is 80%.       Download 1/24/23-3/20/23- 100% use with avg nightly use of 8 hours and 43 minutes on auto CPAP 6-15cm H2O, avg pr is 10cm H2O, AHI is 2.1/hr.     Impression:  1. CAROL (obstructive sleep apnea)    2. Primary hypertension    3. Atrial fibrillation, persistent (HCC)          Plan:  Doing great on auto CPAP. Pressures set at 6-15cm [id number] comfortable.  He reports occasional dry mouth. We discussed humidity settings-auto vs manual.  He was advised to adjust he humidity under \"my options\", on the ResMed device  I reviewed original sleep study and recent download report with pt  He uses the machine and benefits from is use.    I appreciate the opportunity to participate in this patient's care.    Pt is doing great, I asked him to see us in 1 year  He should call sooner if any problems arise.    BECKY Pat  [facility]  Phone: [phone]      Part of this note may be an electronic transcription/translation of spoken language to printed text using the Dragon Dictation System. Some errors may exist even though the document was edited.      "

## 2023-04-24 ENCOUNTER — OFFICE VISIT (OUTPATIENT)
Dept: INTERNAL MEDICINE | Facility: CLINIC | Age: 73
End: 2023-04-24
Payer: MEDICARE

## 2023-04-24 VITALS
TEMPERATURE: 96.8 F | HEIGHT: 72 IN | RESPIRATION RATE: 16 BRPM | DIASTOLIC BLOOD PRESSURE: 80 MMHG | BODY MASS INDEX: 31.56 KG/M2 | SYSTOLIC BLOOD PRESSURE: 132 MMHG | HEART RATE: 70 BPM | WEIGHT: 233 LBS | OXYGEN SATURATION: 100 %

## 2023-04-24 DIAGNOSIS — G47.30 SLEEP APNEA, UNSPECIFIED TYPE: ICD-10-CM

## 2023-04-24 DIAGNOSIS — I48.19 ATRIAL FIBRILLATION, PERSISTENT: Primary | ICD-10-CM

## 2023-04-24 DIAGNOSIS — E11.8 TYPE II DIABETES MELLITUS WITH COMPLICATION: ICD-10-CM

## 2023-04-24 DIAGNOSIS — I10 PRIMARY HYPERTENSION: ICD-10-CM

## 2023-04-24 PROCEDURE — 99214 OFFICE O/P EST MOD 30 MIN: CPT | Performed by: INTERNAL MEDICINE

## 2023-04-24 PROCEDURE — 3079F DIAST BP 80-89 MM HG: CPT | Performed by: INTERNAL MEDICINE

## 2023-04-24 PROCEDURE — 3075F SYST BP GE 130 - 139MM HG: CPT | Performed by: INTERNAL MEDICINE

## 2023-04-24 PROCEDURE — 3052F HG A1C>EQUAL 8.0%<EQUAL 9.0%: CPT | Performed by: INTERNAL MEDICINE

## 2023-04-24 NOTE — PROGRESS NOTES
"Chief Complaint  Hypertension and Diabetes    Subjective        Becki Cruz presents to [facility]  History of Present Illness   No polyuria or polydipsia or visual changes.  No chest pains or shortness of breath.  Taking medication as prescribed.  No side effects reported.  Atrial fibrillation stable.  He is tolerating the CPAP better than he thought and he thinks he feels better with that.    Objective   Vital Signs:  /80 (BP Location: Left arm, Patient Position: Sitting, Cuff Size: Large Adult)   Pulse 70   Temp 96.8 °F (36 °C) (Temporal)   Resp 16   Ht 182.9 cm (72\")   Wt 106 kg (233 lb)   SpO2 100%   BMI 31.60 kg/m²   Estimated body mass index is 31.6 kg/m² as calculated from the following:    Height as of this encounter: 182.9 cm (72\").    Weight as of this encounter: 106 kg (233 lb).             Physical Exam  Vitals and nursing note reviewed.   Constitutional:       Appearance: Normal appearance.   HENT:      Head: Normocephalic and atraumatic.   Cardiovascular:      Rate and Rhythm: Normal rate. Rhythm irregular.   Pulmonary:      Effort: Pulmonary effort is normal.      Breath sounds: Normal breath sounds.   Abdominal:      General: Abdomen is flat.      Palpations: Abdomen is soft.   Musculoskeletal:         General: No swelling or deformity.      Cervical back: Neck supple.      Right lower leg: No edema.      Left lower leg: No edema.   Skin:     General: Skin is warm.      Capillary Refill: Capillary refill takes less than 2 seconds.      Findings: No rash.   Neurological:      General: No focal deficit present.      Mental Status: He is alert and oriented to person, place, and time.        Result Review :        Previous notes reviewed           Assessment and Plan   Diagnoses and all orders for this visit:    1. Atrial fibrillation, persistent (Primary)    2. Sleep apnea, unspecified type    3. Primary hypertension    4. Type II diabetes " mellitus with complication    Type 2 diabetes with suboptimal control previously.  Recheck lab work.  Continue working on diet and exercise.  Consider adding Januvia or Jardiance or GLP-1 agonist.  Chronic atrial fibrillation stable on anticoagulation with no complications at this point.  CPAP has been helpful he thinks       Follow Up   No follow-ups on file.  Patient was given instructions and counseling regarding his condition or for health maintenance advice. Please see specific information pulled into the AVS if appropriate.

## 2023-04-25 LAB
ALBUMIN SERPL-MCNC: 4.6 G/DL (ref 3.7–4.7)
ALBUMIN/CREAT UR: 18 MG/G CREAT (ref 0–29)
ALBUMIN/GLOB SERPL: 1.7 {RATIO} (ref 1.2–2.2)
ALP SERPL-CCNC: 68 IU/L (ref 44–121)
ALT SERPL-CCNC: 15 IU/L (ref 0–44)
AST SERPL-CCNC: 19 IU/L (ref 0–40)
BILIRUB SERPL-MCNC: 0.4 MG/DL (ref 0–1.2)
BUN SERPL-MCNC: 16 MG/DL (ref 8–27)
BUN/CREAT SERPL: 18 (ref 10–24)
CALCIUM SERPL-MCNC: 10.1 MG/DL (ref 8.6–10.2)
CHLORIDE SERPL-SCNC: 100 MMOL/L (ref 96–106)
CHOLEST SERPL-MCNC: 132 MG/DL (ref 100–199)
CHOLEST/HDLC SERPL: 3.2 RATIO (ref 0–5)
CO2 SERPL-SCNC: 25 MMOL/L (ref 20–29)
CREAT SERPL-MCNC: 0.89 MG/DL (ref 0.76–1.27)
CREAT UR-MCNC: 124.9 MG/DL
EGFRCR SERPLBLD CKD-EPI 2021: 91 ML/MIN/1.73
GLOBULIN SER CALC-MCNC: 2.7 G/DL (ref 1.5–4.5)
GLUCOSE SERPL-MCNC: 184 MG/DL (ref 70–99)
HBA1C MFR BLD: 8.7 % (ref 4.8–5.6)
HDLC SERPL-MCNC: 41 MG/DL
LDLC SERPL CALC-MCNC: 69 MG/DL (ref 0–99)
MICROALBUMIN UR-MCNC: 22.9 UG/ML
POTASSIUM SERPL-SCNC: 3.9 MMOL/L (ref 3.5–5.2)
PROT SERPL-MCNC: 7.3 G/DL (ref 6–8.5)
SODIUM SERPL-SCNC: 140 MMOL/L (ref 134–144)
TRIGL SERPL-MCNC: 119 MG/DL (ref 0–149)
VLDLC SERPL CALC-MCNC: 22 MG/DL (ref 5–40)

## 2023-05-26 RX ORDER — AMLODIPINE BESYLATE 10 MG/1
TABLET ORAL
Qty: 90 TABLET | Refills: 1 | Status: SHIPPED | OUTPATIENT
Start: 2023-05-26

## 2023-05-26 RX ORDER — HYDROCHLOROTHIAZIDE 25 MG/1
TABLET ORAL
Qty: 90 TABLET | Refills: 1 | Status: SHIPPED | OUTPATIENT
Start: 2023-05-26

## 2023-08-14 ENCOUNTER — TELEPHONE (OUTPATIENT)
Dept: INTERNAL MEDICINE | Facility: CLINIC | Age: 73
End: 2023-08-14

## 2023-08-14 NOTE — TELEPHONE ENCOUNTER
Caller: Becki Cruz    Relationship: Self    Best call back number: [phone]     What medication are you requesting: GENERIC FORM OF XARELTO    If a prescription is needed, what is your preferred pharmacy and phone number:      [facility] [id number] - [place], [place] - [address] AT [facility] [address] [facility] [phone] [facility] [phone]      Additional notes:PATIENT STATES THE PRICE HAS INCREASED FROM $40 TO $147 AND HE NEEDS SOMETHING MORE AFFORDABLE

## 2023-08-23 ENCOUNTER — OFFICE VISIT (OUTPATIENT)
Dept: INTERNAL MEDICINE | Facility: CLINIC | Age: 73
End: 2023-08-23
Payer: MEDICARE

## 2023-08-23 VITALS
HEIGHT: 72 IN | OXYGEN SATURATION: 100 % | DIASTOLIC BLOOD PRESSURE: 72 MMHG | BODY MASS INDEX: 30.88 KG/M2 | RESPIRATION RATE: 16 BRPM | HEART RATE: 86 BPM | SYSTOLIC BLOOD PRESSURE: 120 MMHG | TEMPERATURE: 96.9 F | WEIGHT: 228 LBS

## 2023-08-23 DIAGNOSIS — I10 PRIMARY HYPERTENSION: ICD-10-CM

## 2023-08-23 DIAGNOSIS — G47.30 SLEEP APNEA, UNSPECIFIED TYPE: ICD-10-CM

## 2023-08-23 DIAGNOSIS — I48.19 ATRIAL FIBRILLATION, PERSISTENT: ICD-10-CM

## 2023-08-23 DIAGNOSIS — E11.8 TYPE II DIABETES MELLITUS WITH COMPLICATION: Primary | ICD-10-CM

## 2023-08-23 PROCEDURE — 3074F SYST BP LT 130 MM HG: CPT | Performed by: INTERNAL MEDICINE

## 2023-08-23 PROCEDURE — 3078F DIAST BP <80 MM HG: CPT | Performed by: INTERNAL MEDICINE

## 2023-08-23 PROCEDURE — 3052F HG A1C>EQUAL 8.0%<EQUAL 9.0%: CPT | Performed by: INTERNAL MEDICINE

## 2023-08-23 PROCEDURE — 99214 OFFICE O/P EST MOD 30 MIN: CPT | Performed by: INTERNAL MEDICINE

## 2023-08-23 RX ORDER — BRIMONIDINE TARTRATE 2 MG/ML
1 SOLUTION/ DROPS OPHTHALMIC 3 TIMES DAILY
COMMUNITY

## 2023-08-23 NOTE — PROGRESS NOTES
"Chief Complaint  Hyperlipidemia, Hypertension, and Diabetes    Subjective        Becki Cruz presents to [facility]  Hyperlipidemia    Hypertension    Diabetes    Blood sugar has reportedly been pretty good.  No polyuria or polydipsia or visual changes.  No chest pains or shortness of breath.  Taking medication as prescribed.  No side effects reported.  Last A1c was 8.5.  He has lost about 5 pounds since previous visit  Chronic atrial fibrillation stable.  He is a little concerned about the pricing on Xarelto  Objective   Vital Signs:  /72 (BP Location: Left arm, Patient Position: Sitting, Cuff Size: Large Adult)   Pulse 86   Temp 96.9 øF (36.1 øC) (Temporal)   Resp 16   Ht 182.9 cm (72\")   Wt 103 kg (228 lb)   SpO2 100%   BMI 30.92 kg/mý   Estimated body mass index is 30.92 kg/mý as calculated from the following:    Height as of this encounter: 182.9 cm (72\").    Weight as of this encounter: 103 kg (228 lb).             Physical Exam  Vitals and nursing note reviewed.   Constitutional:       Appearance: Normal appearance.   HENT:      Head: Normocephalic and atraumatic.   Cardiovascular:      Rate and Rhythm: Normal rate. Rhythm irregular.   Pulmonary:      Effort: Pulmonary effort is normal.      Breath sounds: Normal breath sounds.   Abdominal:      General: Abdomen is flat.      Palpations: Abdomen is soft.   Musculoskeletal:         General: No swelling or deformity.      Cervical back: Neck supple.      Right lower leg: No edema.      Left lower leg: No edema.   Skin:     General: Skin is warm.      Capillary Refill: Capillary refill takes less than 2 seconds.      Findings: No rash.   Neurological:      General: No focal deficit present.      Mental Status: He is alert and oriented to person, place, and time.      Result Review :        Previous notes reviewed           Assessment and Plan   Diagnoses and all orders for this visit:    1. Type II " diabetes mellitus with complication (Primary)  -     Comprehensive Metabolic Panel  -     Hemoglobin A1c  -     Lipid Panel With / Chol / HDL Ratio  -     TSH    2. Atrial fibrillation, persistent    3. Primary hypertension    4. Sleep apnea, unspecified type    Other orders  -     rivaroxaban (Xarelto) 20 MG tablet; Take 1 tablet by mouth Daily With Dinner.  Dispense: 30 tablet; Refill: 12    Type 2 diabetes with previously elevated A1c.  He has lost 5 pounds since previous visit.  On metformin and Glucotrol XL.  Repeat lab work and titrate accordingly  Chronic atrial fibrillation with rate control and Xarelto anticoagulation.  Did recommend checking with [facility] to see if he can get the Xarelto cheaper         Follow Up   Return in about 4 months (around 12/23/2023) for Medicare Wellness.  Patient was given instructions and counseling regarding his condition or for health maintenance advice. Please see specific information pulled into the AVS if appropriate.

## 2023-08-24 LAB
ALBUMIN SERPL-MCNC: 4.6 G/DL (ref 3.5–5.2)
ALBUMIN/GLOB SERPL: 2 G/DL
ALP SERPL-CCNC: 60 U/L (ref 39–117)
ALT SERPL-CCNC: 16 U/L (ref 1–41)
AST SERPL-CCNC: 18 U/L (ref 1–40)
BILIRUB SERPL-MCNC: 0.5 MG/DL (ref 0–1.2)
BUN SERPL-MCNC: 12 MG/DL (ref 8–23)
BUN/CREAT SERPL: 11.8 (ref 7–25)
CALCIUM SERPL-MCNC: 10 MG/DL (ref 8.6–10.5)
CHLORIDE SERPL-SCNC: 101 MMOL/L (ref 98–107)
CHOLEST SERPL-MCNC: 113 MG/DL (ref 0–200)
CHOLEST/HDLC SERPL: 2.9 {RATIO}
CO2 SERPL-SCNC: 26.2 MMOL/L (ref 22–29)
CREAT SERPL-MCNC: 1.02 MG/DL (ref 0.76–1.27)
EGFRCR SERPLBLD CKD-EPI 2021: 77.6 ML/MIN/1.73
GLOBULIN SER CALC-MCNC: 2.3 GM/DL
GLUCOSE SERPL-MCNC: 158 MG/DL (ref 65–99)
HBA1C MFR BLD: 8.4 % (ref 4.8–5.6)
HDLC SERPL-MCNC: 39 MG/DL (ref 40–60)
LDLC SERPL CALC-MCNC: 55 MG/DL (ref 0–100)
POTASSIUM SERPL-SCNC: 3.8 MMOL/L (ref 3.5–5.2)
PROT SERPL-MCNC: 6.9 G/DL (ref 6–8.5)
SODIUM SERPL-SCNC: 139 MMOL/L (ref 136–145)
TRIGL SERPL-MCNC: 97 MG/DL (ref 0–150)
TSH SERPL DL<=0.005 MIU/L-ACNC: 2.41 UIU/ML (ref 0.27–4.2)
VLDLC SERPL CALC-MCNC: 19 MG/DL (ref 5–40)

## 2023-10-29 RX ORDER — LOSARTAN POTASSIUM 100 MG/1
TABLET ORAL
Qty: 90 TABLET | Refills: 3 | Status: SHIPPED | OUTPATIENT
Start: 2023-10-29

## 2023-11-01 ENCOUNTER — OFFICE VISIT (OUTPATIENT)
Dept: SLEEP MEDICINE | Facility: HOSPITAL | Age: 73
End: 2023-11-01
Payer: MEDICARE

## 2023-11-01 VITALS
BODY MASS INDEX: 29.53 KG/M2 | HEART RATE: 82 BPM | SYSTOLIC BLOOD PRESSURE: 135 MMHG | HEIGHT: 72 IN | DIASTOLIC BLOOD PRESSURE: 78 MMHG | OXYGEN SATURATION: 98 % | WEIGHT: 218 LBS

## 2023-11-01 DIAGNOSIS — I48.19 ATRIAL FIBRILLATION, PERSISTENT: ICD-10-CM

## 2023-11-01 DIAGNOSIS — G47.33 OSA (OBSTRUCTIVE SLEEP APNEA): Primary | ICD-10-CM

## 2023-11-01 DIAGNOSIS — I10 PRIMARY HYPERTENSION: ICD-10-CM

## 2023-11-01 DIAGNOSIS — E66.3 OVERWEIGHT (BMI 25.0-29.9): ICD-10-CM

## 2023-11-01 PROCEDURE — G0463 HOSPITAL OUTPT CLINIC VISIT: HCPCS

## 2023-11-01 NOTE — PROGRESS NOTES
[facility]  Telephone: [phone] / Fax: [phone] [place]  Telephone: [phone] / Fax: [phone] [place]    PCP: Esau Munson MD (Tony)    Reason for visit: CAROL f/u    Becki D Anthony is a 73 y.o.male  was last seen at [facility] sleep lab in March 2023. He is doing well on auto CPAP 6-15cm H2O. He is unaware of snoring/apneas while using the machine. He reports some dryness in the mouth. He uses nasal mask which fits well and does not leaks.He replaces the nasal cushion every 6 weeks. He rinses it regularly. His sleep schedule is 10pm-6am. ESS is 5. Subjectively sleep quality is better. He uses the machine and benefits. Machine controls the snoring /apneas.    SH- no tobacco, no alcohol, 1 caffeine.    ROS-negative.    DME [facility]    Current Medications:    Current Outpatient Medications:     amLODIPine (NORVASC) 10 MG tablet, TAKE ONE TABLET BY MOUTH DAILY, Disp: 90 tablet, Rfl: 1    brimonidine (ALPHAGAN) 0.2 % ophthalmic solution, 1 drop 3 (Three) Times a Day., Disp: , Rfl:     glipizide (GLUCOTROL XL) 10 MG 24 hr tablet, Take 1 tablet by mouth Daily., Disp: 90 tablet, Rfl: 4    hydroCHLOROthiazide (HYDRODIURIL) 25 MG tablet, TAKE ONE TABLET BY MOUTH DAILY, Disp: 90 tablet, Rfl: 1    Latanoprost 0.005 % emulsion, Apply  to eye(s) as directed by provider. (Patient not taking: Reported on 8/23/2023), Disp: , Rfl:     losartan (COZAAR) 100 MG tablet, TAKE ONE TABLET BY MOUTH DAILY, Disp: 90 tablet, Rfl: 3    metFORMIN (GLUCOPHAGE) 1000 MG tablet, TAKE ONE TABLET BY MOUTH TWICE A DAY WITH MEALS, Disp: 180 tablet, Rfl: 2    metoprolol succinate XL (TOPROL-XL) 25 MG 24 hr tablet, TAKE ONE TABLET BY MOUTH DAILY, Disp: 90 tablet, Rfl: 4    multivitamin with minerals tablet tablet, Take 1 tablet by mouth Daily., Disp: , Rfl:     rivaroxaban (Xarelto) 20 MG tablet, Take 1 tablet by mouth Daily With Dinner., Disp: 30 tablet, Rfl: 12    Saw Palmetto 450 MG capsule, Take 450 mg  "by mouth., Disp: , Rfl:     simvastatin (ZOCOR) 40 MG tablet, TAKE ONE TABLET BY MOUTH ONCE NIGHTLY, Disp: 90 tablet, Rfl: 2   also entered in Sleep Questionnaire    Patient  has a past medical history of Abnormal ECG (10-), Actinic keratosis, Atrial fibrillation, persistent (11/21/2022), Cellulitis, unspecified, Diabetic eye exam, Essential (primary) hypertension, Laceration without foreign body, right lower leg, initial encounter, Mixed hyperlipidemia, Osteoarthritis of first carpometacarpal joint, unspecified, Seborrheic keratosis, Sleep apnea (2010), Type 2 diabetes mellitus without complication, and Underimmunization status.    I have reviewed the Past Medical History, Past Surgical History, Social History and Family History.    Vital Signs /78   Pulse 82   Ht 182.9 cm (72\")   Wt 98.9 kg (218 lb)   SpO2 98%   BMI 29.57 kg/m²  Body mass index is 29.57 kg/m².    General Alert and oriented. No acute distress noted   Pharynx/Throat Class IV Mallampati airway, large tongue, no evidence of redundant lateral pharyngeal tissue. No oral lesions. No thrush. Moist mucous membranes.   Head Normocephalic. Symmetrical. Atraumatic.    Nose No septal deviation. No drainage   Chest Wall Normal shape. Symmetric expansion with respiration. No tenderness.   Neck Trachea midline, no thyromegaly or adenopathy    Lungs Clear to auscultation bilaterally. No wheezes. No rhonchi. No rales. Respirations regular, even and unlabored.   Heart Regular rhythm and normal rate. Normal S1 and S2. No murmur   Abdomen Soft, non-tender and non-distended. Normal bowel sounds. No masses.   Extremities Moves all extremities well. No edema   Psychiatric Normal mood and affect.     Testing:  Download 8/2/23-10/30/23 99% use with average nightly use of 8 hours and 23 minutes on auto CPAP 6-15cm H2O, avg pressure is 10.1cm H2O, AHI is 1.5/hr, leak is 38 L.min.    Study-HST Jan 2023-Home Sleep Study Findings:   Recording start time 10:11 " PM and recording end time 7:37 AM. Total recording time 566.6 minutes and monitoring time 563.1 minutes. The patient had 2 obstructive events and 84 partial obstructive events. AHI for total sleep time is 9.2 Lowest oxygen saturation is 80%.    Impression:  1. CAROL (obstructive sleep apnea)    2. Atrial fibrillation, persistent    3. Primary hypertension    4. Overweight (BMI 25.0-29.9)          Plan:  Becki is doing great on auto CPAP 6-15cm H2O and benefits from its use in terms of reduction of hypersomnia/snoring. He understands the importance of using the machine in view of A-fib and HTN. AHI appears to be within adequate range. I reviewed download report and original sleep study report with the patient. I advised pt to contact us if PAP pressures feel excessive or insufficient.  Weight loss will be strongly beneficial to reduce the severity of sleep-disordered breathing.   Send orders to DME to renew all CPAP accessories.    Follow up with Dr. English in one year    Thank you for allowing me to participate in your patient's care.      BECKY Pat  [facility]  Phone: [phone]      Part of this note may be an electronic transcription/translation of spoken language to printed text using the Dragon Dictation System.

## 2023-11-06 RX ORDER — GLIPIZIDE 10 MG/1
10 TABLET, FILM COATED, EXTENDED RELEASE ORAL DAILY
Qty: 90 TABLET | Refills: 4 | Status: SHIPPED | OUTPATIENT
Start: 2023-11-06

## 2023-11-19 RX ORDER — HYDROCHLOROTHIAZIDE 25 MG/1
25 TABLET ORAL DAILY
Qty: 90 TABLET | Refills: 1 | Status: SHIPPED | OUTPATIENT
Start: 2023-11-19

## 2023-11-19 RX ORDER — AMLODIPINE BESYLATE 10 MG/1
10 TABLET ORAL DAILY
Qty: 90 TABLET | Refills: 1 | Status: SHIPPED | OUTPATIENT
Start: 2023-11-19

## 2023-11-28 RX ORDER — SIMVASTATIN 40 MG
TABLET ORAL
Qty: 90 TABLET | Refills: 2 | Status: SHIPPED | OUTPATIENT
Start: 2023-11-28

## 2024-01-08 ENCOUNTER — OFFICE VISIT (OUTPATIENT)
Dept: INTERNAL MEDICINE | Facility: CLINIC | Age: 74
End: 2024-01-08
Payer: MEDICARE

## 2024-01-08 VITALS
RESPIRATION RATE: 16 BRPM | HEART RATE: 85 BPM | SYSTOLIC BLOOD PRESSURE: 120 MMHG | BODY MASS INDEX: 30.61 KG/M2 | WEIGHT: 226 LBS | OXYGEN SATURATION: 100 % | TEMPERATURE: 96.3 F | HEIGHT: 72 IN | DIASTOLIC BLOOD PRESSURE: 68 MMHG

## 2024-01-08 DIAGNOSIS — E11.8 TYPE II DIABETES MELLITUS WITH COMPLICATION: ICD-10-CM

## 2024-01-08 DIAGNOSIS — I10 PRIMARY HYPERTENSION: ICD-10-CM

## 2024-01-08 DIAGNOSIS — Z12.5 SPECIAL SCREENING FOR MALIGNANT NEOPLASM OF PROSTATE: ICD-10-CM

## 2024-01-08 DIAGNOSIS — Z00.00 ROUTINE GENERAL MEDICAL EXAMINATION AT A HEALTH CARE FACILITY: Primary | ICD-10-CM

## 2024-01-08 DIAGNOSIS — I48.19 ATRIAL FIBRILLATION, PERSISTENT: ICD-10-CM

## 2024-01-08 PROCEDURE — 90662 IIV NO PRSV INCREASED AG IM: CPT | Performed by: INTERNAL MEDICINE

## 2024-01-08 PROCEDURE — 3074F SYST BP LT 130 MM HG: CPT | Performed by: INTERNAL MEDICINE

## 2024-01-08 PROCEDURE — 3078F DIAST BP <80 MM HG: CPT | Performed by: INTERNAL MEDICINE

## 2024-01-08 PROCEDURE — G0439 PPPS, SUBSEQ VISIT: HCPCS | Performed by: INTERNAL MEDICINE

## 2024-01-08 PROCEDURE — G0008 ADMIN INFLUENZA VIRUS VAC: HCPCS | Performed by: INTERNAL MEDICINE

## 2024-01-08 PROCEDURE — 1170F FXNL STATUS ASSESSED: CPT | Performed by: INTERNAL MEDICINE

## 2024-01-08 RX ORDER — SEMAGLUTIDE 0.68 MG/ML
0.25 INJECTION, SOLUTION SUBCUTANEOUS WEEKLY
Qty: 3 ML | Refills: 3 | Status: SHIPPED | OUTPATIENT
Start: 2024-01-08

## 2024-01-08 NOTE — PROGRESS NOTES
The ABCs of the Annual Wellness Visit  Subsequent Medicare Wellness Visit    Subjective      Becki Cruz is a 73 y.o. male who presents for a Subsequent Medicare Wellness Visit.    The following portions of the patient's history were reviewed and   updated as appropriate: allergies, current medications, past family history, past medical history, past social history, past surgical history, and problem list.    Compared to one year ago, the patient feels his physical   health is the same.    Compared to one year ago, the patient feels his mental   health is the same.    Recent Hospitalizations:  He was not admitted to the hospital during the last year.       Current Medical Providers:  Patient Care Team:  Esau Munson MD (Tony) as PCP - General (Internal Medicine)    Outpatient Medications Prior to Visit   Medication Sig Dispense Refill    amLODIPine (NORVASC) 10 MG tablet TAKE 1 TABLET BY MOUTH DAILY 90 tablet 1    brimonidine (ALPHAGAN) 0.2 % ophthalmic solution 1 drop 3 (Three) Times a Day.      glipizide (GLUCOTROL XL) 10 MG 24 hr tablet TAKE ONE TABLET BY MOUTH DAILY 90 tablet 4    hydroCHLOROthiazide (HYDRODIURIL) 25 MG tablet TAKE 1 TABLET BY MOUTH DAILY 90 tablet 1    losartan (COZAAR) 100 MG tablet TAKE ONE TABLET BY MOUTH DAILY 90 tablet 3    metFORMIN (GLUCOPHAGE) 1000 MG tablet TAKE ONE TABLET BY MOUTH TWICE A DAY WITH MEALS 180 tablet 2    metoprolol succinate XL (TOPROL-XL) 25 MG 24 hr tablet TAKE ONE TABLET BY MOUTH DAILY 90 tablet 4    multivitamin with minerals tablet tablet Take 1 tablet by mouth Daily.      rivaroxaban (Xarelto) 20 MG tablet Take 1 tablet by mouth Daily With Dinner. 30 tablet 12    Saw Palmetto 450 MG capsule Take 450 mg by mouth.      simvastatin (ZOCOR) 40 MG tablet TAKE ONE TABLET BY MOUTH ONCE NIGHTLY 90 tablet 2    Latanoprost 0.005 % emulsion Apply  to eye(s) as directed by provider. (Patient not taking: Reported on 8/23/2023)       No facility-administered  "medications prior to visit.       No opioid medication identified on active medication list. I have reviewed chart for other potential  high risk medication/s and harmful drug interactions in the elderly.        Aspirin is not on active medication list.  Aspirin use is not indicated based on review of current medical condition/s. Risk of harm outweighs potential benefits.  .    Patient Active Problem List   Diagnosis    Hypertension    Atrial fibrillation, persistent    Sleep apnea     Advance Care Planning   Advance Care Planning     Advance Directive is not on file.  ACP discussion was held with the patient during this visit. Patient has an advance directive (not in EMR), copy requested.     Objective    Vitals:    24 0935   BP: 120/68   BP Location: Left arm   Patient Position: Sitting   Cuff Size: Large Adult   Pulse: 85   Resp: 16   Temp: 96.3 °F (35.7 °C)   TempSrc: Temporal   SpO2: 100%   Weight: 103 kg (226 lb)   Height: 182.9 cm (72\")     Estimated body mass index is 30.65 kg/m² as calculated from the following:    Height as of this encounter: 182.9 cm (72\").    Weight as of this encounter: 103 kg (226 lb).    BMI is >= 30 and <35. (Class 1 Obesity). The following options were offered after discussion;: weight loss educational material (shared in after visit summary) and exercise counseling/recommendations      Does the patient have evidence of cognitive impairment?   No            HEALTH RISK ASSESSMENT    Smoking Status:  Social History     Tobacco Use   Smoking Status Former   Smokeless Tobacco Never     Alcohol Consumption:  Social History     Substance and Sexual Activity   Alcohol Use Never    Comment: UNKNOWN     Fall Risk Screen:    VIRGILIOADI Fall Risk Assessment was completed, and patient is at LOW risk for falls.Assessment completed on:2024    Depression Screenin/8/2024     9:38 AM   PHQ-2/PHQ-9 Depression Screening   Little Interest or Pleasure in Doing Things 0-->not at all "   Feeling Down, Depressed or Hopeless 0-->not at all   PHQ-9: Brief Depression Severity Measure Score 0       Health Habits and Functional and Cognitive Screenin/8/2024     9:37 AM   Functional & Cognitive Status   Do you have difficulty preparing food and eating? No   Do you have difficulty bathing yourself, getting dressed or grooming yourself? No   Do you have difficulty using the toilet? No   Do you have difficulty moving around from place to place? No   Do you have trouble with steps or getting out of a bed or a chair? No   Current Diet Unhealthy Diet   Dental Exam Up to date   Eye Exam Up to date   Exercise (times per week) 7 times per week   Current Exercises Include Other   Do you need help using the phone?  No   Are you deaf or do you have serious difficulty hearing?  No   Do you need help to go to places out of walking distance? No   Do you need help shopping? No   Do you need help preparing meals?  No   Do you need help with housework?  No   Do you need help with laundry? No   Do you need help taking your medications? No   Do you need help managing money? No   Do you ever drive or ride in a car without wearing a seat belt? No   Have you felt unusual stress, anger or loneliness in the last month? No   Who do you live with? Spouse   If you need help, do you have trouble finding someone available to you? No   Have you been bothered in the last four weeks by sexual problems? No   Do you have difficulty concentrating, remembering or making decisions? No       Age-appropriate Screening Schedule:  Refer to the list below for future screening recommendations based on patient's age, sex and/or medical conditions. Orders for these recommended tests are listed in the plan section. The patient has been provided with a written plan.    Health Maintenance   Topic Date Due    ZOSTER VACCINE (1 of 2) Never done    Pneumococcal Vaccine 65+ (2 of 2 - PCV) 10/24/2019    AAA SCREEN (ONE-TIME)  Never done    COVID-19  Vaccine (5 - 2023-24 season) 09/01/2023    ANNUAL WELLNESS VISIT  10/19/2023    DIABETIC FOOT EXAM  10/19/2023    BMI FOLLOWUP  10/19/2023    HEMOGLOBIN A1C  02/23/2024    URINE MICROALBUMIN  04/24/2024    LIPID PANEL  08/23/2024    DIABETIC EYE EXAM  10/31/2024    COLORECTAL CANCER SCREENING  07/20/2025    TDAP/TD VACCINES (2 - Td or Tdap) 12/31/2032    HEPATITIS C SCREENING  Completed    INFLUENZA VACCINE  Completed                  CMS Preventative Services Quick Reference  Risk Factors Identified During Encounter:    Immunizations Discussed/Encouraged: Influenza, Prevnar 20 (Pneumococcal 20-valent conjugate), and COVID19    The above risks/problems have been discussed with the patient.  Pertinent information has been shared with the patient in the After Visit Summary.    Diagnoses and all orders for this visit:    1. Routine general medical examination at a health care facility (Primary)    2. Type II diabetes mellitus with complication  -     Urinalysis With Microscopic - Urine, Clean Catch  -     MicroAlbumin, Urine, Random - Urine, Clean Catch  -     CBC & Differential  -     Comprehensive Metabolic Panel  -     Lipid Panel With / Chol / HDL Ratio  -     TSH  -     PSA Screen  -     Hemoglobin A1c    3. Atrial fibrillation, persistent  -     Urinalysis With Microscopic - Urine, Clean Catch  -     MicroAlbumin, Urine, Random - Urine, Clean Catch  -     CBC & Differential  -     Comprehensive Metabolic Panel  -     Lipid Panel With / Chol / HDL Ratio  -     TSH  -     PSA Screen  -     Hemoglobin A1c    4. Primary hypertension  -     Urinalysis With Microscopic - Urine, Clean Catch  -     MicroAlbumin, Urine, Random - Urine, Clean Catch  -     CBC & Differential  -     Comprehensive Metabolic Panel  -     Lipid Panel With / Chol / HDL Ratio  -     TSH  -     PSA Screen  -     Hemoglobin A1c    5. Special screening for malignant neoplasm of prostate  -     Urinalysis With Microscopic - Urine, Clean Catch  -      MicroAlbumin, Urine, Random - Urine, Clean Catch  -     CBC & Differential  -     Comprehensive Metabolic Panel  -     Lipid Panel With / Chol / HDL Ratio  -     TSH  -     PSA Screen  -     Hemoglobin A1c    Other orders  -     Fluzone High-Dose 65+yrs  -     Semaglutide,0.25 or 0.5MG/DOS, (Ozempic, 0.25 or 0.5 MG/DOSE,) 2 MG/3ML solution pen-injector; Inject 0.25 mg under the skin into the appropriate area as directed 1 (One) Time Per Week.  Dispense: 3 mL; Refill: 3    Here for wellness.  Toprol has been doing pretty good although has been less active than little more stress recently related to his sister passing away in October.  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  He has not been really testing his blood sugar but feels like it probably can be a little higher.  We did talk about adding the Ozempic low-dose once a week.  Cautioned on side effects.  He is a little bit anxious about the shot part of that.  Follow-up in 3 months or pending lab results.  Flu shot given today.  He can return for the COVID shot next week and probably to do the Prevnar 20 in the near future also.  Body mass index is 30.65 kg/m².      Follow Up:   Next Medicare Wellness visit to be scheduled in 1 year.      An After Visit Summary and PPPS were made available to the patient.

## 2024-01-10 LAB
ALBUMIN SERPL-MCNC: 4.3 G/DL (ref 3.8–4.8)
ALBUMIN/GLOB SERPL: 1.6 {RATIO} (ref 1.2–2.2)
ALP SERPL-CCNC: 65 IU/L (ref 44–121)
ALT SERPL-CCNC: 15 IU/L (ref 0–44)
AST SERPL-CCNC: 22 IU/L (ref 0–40)
BASOPHILS # BLD AUTO: 0 X10E3/UL (ref 0–0.2)
BASOPHILS NFR BLD AUTO: 1 %
BILIRUB SERPL-MCNC: 0.5 MG/DL (ref 0–1.2)
BUN SERPL-MCNC: 22 MG/DL (ref 8–27)
BUN/CREAT SERPL: 23 (ref 10–24)
CALCIUM SERPL-MCNC: 9.8 MG/DL (ref 8.6–10.2)
CHLORIDE SERPL-SCNC: 102 MMOL/L (ref 96–106)
CHOLEST SERPL-MCNC: 122 MG/DL (ref 100–199)
CHOLEST/HDLC SERPL: 3 RATIO (ref 0–5)
CO2 SERPL-SCNC: 22 MMOL/L (ref 20–29)
CREAT SERPL-MCNC: 0.95 MG/DL (ref 0.76–1.27)
EGFRCR SERPLBLD CKD-EPI 2021: 85 ML/MIN/1.73
EOSINOPHIL # BLD AUTO: 0.2 X10E3/UL (ref 0–0.4)
EOSINOPHIL NFR BLD AUTO: 2 %
ERYTHROCYTE [DISTWIDTH] IN BLOOD BY AUTOMATED COUNT: 13 % (ref 11.6–15.4)
GLOBULIN SER CALC-MCNC: 2.7 G/DL (ref 1.5–4.5)
GLUCOSE SERPL-MCNC: 212 MG/DL (ref 70–99)
GLUCOSE UR QL STRIP: NORMAL
HBA1C MFR BLD: 8.6 % (ref 4.8–5.6)
HCT VFR BLD AUTO: 38.9 % (ref 37.5–51)
HDLC SERPL-MCNC: 41 MG/DL
HGB BLD-MCNC: 12.9 G/DL (ref 13–17.7)
IMM GRANULOCYTES # BLD AUTO: 0 X10E3/UL (ref 0–0.1)
IMM GRANULOCYTES NFR BLD AUTO: 0 %
KETONES UR QL STRIP: NORMAL
LDLC SERPL CALC-MCNC: 58 MG/DL (ref 0–99)
LYMPHOCYTES # BLD AUTO: 2.6 X10E3/UL (ref 0.7–3.1)
LYMPHOCYTES NFR BLD AUTO: 37 %
MCH RBC QN AUTO: 29.3 PG (ref 26.6–33)
MCHC RBC AUTO-ENTMCNC: 33.2 G/DL (ref 31.5–35.7)
MCV RBC AUTO: 88 FL (ref 79–97)
MICROALBUMIN UR-MCNC: 28.7 UG/ML
MICROALBUMIN UR-MCNC: NORMAL UG/ML
MONOCYTES # BLD AUTO: 0.6 X10E3/UL (ref 0.1–0.9)
MONOCYTES NFR BLD AUTO: 8 %
NEUTROPHILS # BLD AUTO: 3.7 X10E3/UL (ref 1.4–7)
NEUTROPHILS NFR BLD AUTO: 52 %
PH UR STRIP: NORMAL [PH]
PLATELET # BLD AUTO: 276 X10E3/UL (ref 150–450)
POTASSIUM SERPL-SCNC: 4.1 MMOL/L (ref 3.5–5.2)
PROT SERPL-MCNC: 7 G/DL (ref 6–8.5)
PROT UR QL STRIP: NORMAL
PSA SERPL-MCNC: 5.5 NG/ML (ref 0–4)
RBC # BLD AUTO: 4.4 X10E6/UL (ref 4.14–5.8)
SODIUM SERPL-SCNC: 139 MMOL/L (ref 134–144)
SP GR UR STRIP: NORMAL
SPECIMEN STATUS: NORMAL
TRIGL SERPL-MCNC: 128 MG/DL (ref 0–149)
TSH SERPL DL<=0.005 MIU/L-ACNC: 2.26 UIU/ML (ref 0.45–4.5)
VLDLC SERPL CALC-MCNC: 23 MG/DL (ref 5–40)
WBC # BLD AUTO: 7.2 X10E3/UL (ref 3.4–10.8)

## 2024-01-11 LAB
APPEARANCE UR: ABNORMAL
BACTERIA #/AREA URNS HPF: ABNORMAL /HPF
BILIRUB UR QL STRIP: NEGATIVE
CASTS URNS MICRO: ABNORMAL
COLOR UR: ABNORMAL
EPI CELLS #/AREA URNS HPF: ABNORMAL /HPF
GLUCOSE UR QL STRIP: ABNORMAL
HGB UR QL STRIP: NEGATIVE
KETONES UR QL STRIP: ABNORMAL
LEUKOCYTE ESTERASE UR QL STRIP: NEGATIVE
NITRITE UR QL STRIP: NEGATIVE
PH UR STRIP: <=5 [PH] (ref 5–8)
PROT UR QL STRIP: NEGATIVE
RBC #/AREA URNS HPF: ABNORMAL /HPF
SP GR UR STRIP: 1.02 (ref 1–1.03)
UROBILINOGEN UR STRIP-MCNC: ABNORMAL MG/DL
WBC #/AREA URNS HPF: ABNORMAL /HPF
WRITTEN AUTHORIZATION: NORMAL

## 2024-01-12 ENCOUNTER — TELEPHONE (OUTPATIENT)
Dept: INTERNAL MEDICINE | Facility: CLINIC | Age: 74
End: 2024-01-12
Payer: MEDICARE

## 2024-01-12 NOTE — TELEPHONE ENCOUNTER
Talk to the patient about his labs.  We talked about a GLP-1 agonist but he wants to work on his diet and exercise first and reevaluate that in April.  I think that is reasonable.  PSA is stable at 5.5 normal for age.  Will continue to monitor that periodically

## 2024-02-13 RX ORDER — METOPROLOL SUCCINATE 25 MG/1
TABLET, EXTENDED RELEASE ORAL
Qty: 90 TABLET | Refills: 4 | Status: SHIPPED | OUTPATIENT
Start: 2024-02-13

## 2024-02-28 ENCOUNTER — TELEPHONE (OUTPATIENT)
Dept: INTERNAL MEDICINE | Facility: CLINIC | Age: 74
End: 2024-02-28
Payer: MEDICARE

## 2024-02-28 NOTE — TELEPHONE ENCOUNTER
Caller: Becki Cruz    Relationship: Self    Best call back number: [phone]    What is the best time to reach you: ANYTIME     Who are you requesting to speak with (clinical staff, provider,  specific staff member): CLINICAL     What was the call regarding: PATIENT STATES HE RECEIVED A BILL FROM [facility] FOR THE LABS HE HAD DONE FOR HIS ANNUAL WELLNESS VISIT.     PAT[facility]OLD HIM THE DIAGNOSIS CODE NEEDS TO BE CHANGED BY THE OFFICE FOR MEDICARE TO COVER THE ANNUAL LABS.     PATIENT IS REQUESTING A CALL BACK.

## 2024-04-09 ENCOUNTER — OFFICE VISIT (OUTPATIENT)
Dept: INTERNAL MEDICINE | Facility: CLINIC | Age: 74
End: 2024-04-09
Payer: MEDICARE

## 2024-04-09 VITALS
TEMPERATURE: 96.4 F | DIASTOLIC BLOOD PRESSURE: 80 MMHG | RESPIRATION RATE: 16 BRPM | WEIGHT: 225 LBS | HEIGHT: 72 IN | HEART RATE: 77 BPM | OXYGEN SATURATION: 99 % | BODY MASS INDEX: 30.48 KG/M2 | SYSTOLIC BLOOD PRESSURE: 132 MMHG

## 2024-04-09 DIAGNOSIS — E11.8 TYPE II DIABETES MELLITUS WITH COMPLICATION: Primary | ICD-10-CM

## 2024-04-09 DIAGNOSIS — G47.30 SLEEP APNEA, UNSPECIFIED TYPE: ICD-10-CM

## 2024-04-09 DIAGNOSIS — I48.19 ATRIAL FIBRILLATION, PERSISTENT: ICD-10-CM

## 2024-04-09 DIAGNOSIS — I10 PRIMARY HYPERTENSION: ICD-10-CM

## 2024-04-09 PROCEDURE — 3052F HG A1C>EQUAL 8.0%<EQUAL 9.0%: CPT | Performed by: INTERNAL MEDICINE

## 2024-04-09 PROCEDURE — 99214 OFFICE O/P EST MOD 30 MIN: CPT | Performed by: INTERNAL MEDICINE

## 2024-04-09 PROCEDURE — 90480 ADMN SARSCOV2 VAC 1/ONLY CMP: CPT | Performed by: INTERNAL MEDICINE

## 2024-04-09 PROCEDURE — 91320 SARSCV2 VAC 30MCG TRS-SUC IM: CPT | Performed by: INTERNAL MEDICINE

## 2024-04-09 PROCEDURE — 3075F SYST BP GE 130 - 139MM HG: CPT | Performed by: INTERNAL MEDICINE

## 2024-04-09 PROCEDURE — 3079F DIAST BP 80-89 MM HG: CPT | Performed by: INTERNAL MEDICINE

## 2024-04-09 RX ORDER — AMLODIPINE BESYLATE 5 MG/1
5 TABLET ORAL DAILY
Qty: 90 TABLET | Refills: 1 | Status: SHIPPED | OUTPATIENT
Start: 2024-04-09

## 2024-04-09 NOTE — PROGRESS NOTES
"Chief Complaint  Hyperlipidemia, Hypertension, and Diabetes    Subjective        Becki Cruz presents to [facility]  Hyperlipidemia    Hypertension    Diabetes    Blood sugar has reportedly been ?.  No polyuria or polydipsia or visual changes.  No chest pains or shortness of breath.  Taking medication as prescribed.  No side effects reported.   He has noticed a little bit more peripheral edema on amlodipine 10 mg daily.  No PND or orthopnea  Objective   Vital Signs:  /80 (BP Location: Left arm, Patient Position: Sitting, Cuff Size: Large Adult)   Pulse 77   Temp 96.4 °F (35.8 °C) (Temporal)   Resp 16   Ht 182.9 cm (72\")   Wt 102 kg (225 lb)   SpO2 99%   BMI 30.52 kg/m²   Estimated body mass index is 30.52 kg/m² as calculated from the following:    Height as of this encounter: 182.9 cm (72\").    Weight as of this encounter: 102 kg (225 lb).               Physical Exam  Vitals and nursing note reviewed.   Constitutional:       Appearance: Normal appearance.   HENT:      Head: Normocephalic and atraumatic.   Cardiovascular:      Rate and Rhythm: Normal rate. Rhythm irregular.      Heart sounds: No murmur heard.  Pulmonary:      Effort: Pulmonary effort is normal.      Breath sounds: Normal breath sounds.   Abdominal:      General: Abdomen is flat.      Palpations: Abdomen is soft.   Musculoskeletal:         General: No swelling or deformity.      Cervical back: Neck supple.      Right lower leg: No edema.      Left lower leg: No edema.   Skin:     General: Skin is warm.      Capillary Refill: Capillary refill takes less than 2 seconds.      Findings: No rash.   Neurological:      General: No focal deficit present.      Mental Status: He is alert and oriented to person, place, and time.        Result Review :          Previous labs reviewed            Assessment and Plan     Diagnoses and all orders for this visit:    1. Type II diabetes mellitus with " complication (Primary)  -     Comprehensive Metabolic Panel  -     Hemoglobin A1c  -     Lipid Panel With / Chol / HDL Ratio  -     TSH    2. Atrial fibrillation, persistent  -     Comprehensive Metabolic Panel  -     Hemoglobin A1c  -     Lipid Panel With / Chol / HDL Ratio  -     TSH    3. Primary hypertension  -     Comprehensive Metabolic Panel  -     Hemoglobin A1c  -     Lipid Panel With / Chol / HDL Ratio  -     TSH    4. Sleep apnea, unspecified type  Comments:  auto CPAP    Other orders  -     amLODIPine (NORVASC) 5 MG tablet; Take 1 tablet by mouth Daily.  Dispense: 90 tablet; Refill: 1  -     COVID-19 F23 (Pfizer) 12yrs+ (COMIRNATY)    Type 2 diabetes with suspect mild blood sugar elevation as no real changes from previous visit.  He did not start the semaglutide.  He is a little reluctant to do the injections but he says he will do them if the A1c's not to target.  Mild peripheral edema with no evidence of any CHF clinically.  Reduce amlodipine to 5 mg daily and recheck on follow-up.  Atrial fibrillation stable clinically.  Talked him about follow-up with cardiology to see if might consider cardioversion or ablation       Follow Up     Return in about 3 months (around 7/9/2024).  Patient was given instructions and counseling regarding his condition or for health maintenance advice. Please see specific information pulled into the AVS if appropriate.

## 2024-04-10 ENCOUNTER — TELEPHONE (OUTPATIENT)
Dept: INTERNAL MEDICINE | Facility: CLINIC | Age: 74
End: 2024-04-10
Payer: MEDICARE

## 2024-04-10 LAB
ALBUMIN SERPL-MCNC: 4.7 G/DL (ref 3.5–5.2)
ALBUMIN/GLOB SERPL: 1.7 G/DL
ALP SERPL-CCNC: 77 U/L (ref 39–117)
ALT SERPL-CCNC: 17 U/L (ref 1–41)
AST SERPL-CCNC: 20 U/L (ref 1–40)
BILIRUB SERPL-MCNC: 0.5 MG/DL (ref 0–1.2)
BUN SERPL-MCNC: 17 MG/DL (ref 8–23)
BUN/CREAT SERPL: 15.6 (ref 7–25)
CALCIUM SERPL-MCNC: 10.4 MG/DL (ref 8.6–10.5)
CHLORIDE SERPL-SCNC: 99 MMOL/L (ref 98–107)
CHOLEST SERPL-MCNC: 133 MG/DL (ref 0–200)
CHOLEST/HDLC SERPL: 3.17 {RATIO}
CO2 SERPL-SCNC: 27.1 MMOL/L (ref 22–29)
CREAT SERPL-MCNC: 1.09 MG/DL (ref 0.76–1.27)
EGFRCR SERPLBLD CKD-EPI 2021: 71.7 ML/MIN/1.73
GLOBULIN SER CALC-MCNC: 2.7 GM/DL
GLUCOSE SERPL-MCNC: 205 MG/DL (ref 65–99)
HBA1C MFR BLD: 8.9 % (ref 4.8–5.6)
HDLC SERPL-MCNC: 42 MG/DL (ref 40–60)
LDLC SERPL CALC-MCNC: 66 MG/DL (ref 0–100)
POTASSIUM SERPL-SCNC: 4.3 MMOL/L (ref 3.5–5.2)
PROT SERPL-MCNC: 7.4 G/DL (ref 6–8.5)
SODIUM SERPL-SCNC: 139 MMOL/L (ref 136–145)
TRIGL SERPL-MCNC: 141 MG/DL (ref 0–150)
TSH SERPL DL<=0.005 MIU/L-ACNC: 2.35 UIU/ML (ref 0.27–4.2)
VLDLC SERPL CALC-MCNC: 25 MG/DL (ref 5–40)

## 2024-04-10 RX ORDER — SEMAGLUTIDE 0.68 MG/ML
0.25 INJECTION, SOLUTION SUBCUTANEOUS WEEKLY
Qty: 3 ML | Refills: 3 | Status: SHIPPED | OUTPATIENT
Start: 2024-04-10

## 2024-04-10 NOTE — TELEPHONE ENCOUNTER
Talk to the patient about his lab work.  A1c up from previous.  He is in agreement to start the semaglutide weekly.  Follow-up as scheduled.  We can titrate the dose prior to follow-up if incomplete response and tolerating the initial dose okay

## 2024-04-11 ENCOUNTER — TELEPHONE (OUTPATIENT)
Dept: INTERNAL MEDICINE | Facility: CLINIC | Age: 74
End: 2024-04-11
Payer: MEDICARE

## 2024-04-11 NOTE — TELEPHONE ENCOUNTER
Called patient to advise him that we had contacted [facility] recently regarding a coding issue for his 1/9/24 date of service. We called them again today and they had not rebilled his account. They added the new codes today and will rebill. Could take up to 30 days to process.    Hub to relay

## 2024-05-15 RX ORDER — HYDROCHLOROTHIAZIDE 25 MG/1
25 TABLET ORAL DAILY
Qty: 90 TABLET | Refills: 1 | OUTPATIENT
Start: 2024-05-15

## 2024-05-15 RX ORDER — AMLODIPINE BESYLATE 10 MG/1
10 TABLET ORAL DAILY
Qty: 90 TABLET | Refills: 1 | OUTPATIENT
Start: 2024-05-15

## 2024-05-24 RX ORDER — HYDROCHLOROTHIAZIDE 25 MG/1
25 TABLET ORAL DAILY
Qty: 90 TABLET | Refills: 1 | Status: SHIPPED | OUTPATIENT
Start: 2024-05-24

## 2024-07-09 ENCOUNTER — OFFICE VISIT (OUTPATIENT)
Dept: INTERNAL MEDICINE | Facility: CLINIC | Age: 74
End: 2024-07-09
Payer: MEDICARE

## 2024-07-09 VITALS
WEIGHT: 227 LBS | DIASTOLIC BLOOD PRESSURE: 60 MMHG | HEIGHT: 72 IN | OXYGEN SATURATION: 99 % | RESPIRATION RATE: 16 BRPM | SYSTOLIC BLOOD PRESSURE: 126 MMHG | HEART RATE: 90 BPM | BODY MASS INDEX: 30.75 KG/M2

## 2024-07-09 DIAGNOSIS — E11.8 TYPE II DIABETES MELLITUS WITH COMPLICATION: Primary | ICD-10-CM

## 2024-07-09 DIAGNOSIS — I48.19 ATRIAL FIBRILLATION, PERSISTENT: ICD-10-CM

## 2024-07-09 PROCEDURE — 3074F SYST BP LT 130 MM HG: CPT | Performed by: INTERNAL MEDICINE

## 2024-07-09 PROCEDURE — 99214 OFFICE O/P EST MOD 30 MIN: CPT | Performed by: INTERNAL MEDICINE

## 2024-07-09 PROCEDURE — 3052F HG A1C>EQUAL 8.0%<EQUAL 9.0%: CPT | Performed by: INTERNAL MEDICINE

## 2024-07-09 PROCEDURE — 3078F DIAST BP <80 MM HG: CPT | Performed by: INTERNAL MEDICINE

## 2024-07-09 NOTE — PROGRESS NOTES
"Chief Complaint  Hyperlipidemia, Hypertension, and Diabetes    Subjective        Becki Cruz presents to [facility]  Hyperlipidemia    Hypertension    Diabetes      Blood sugar has reportedly been?.  No polyuria or polydipsia or visual changes.  No chest pains or shortness of breath.  Taking medication as prescribed.  No side effects reported.  Seems to be tolerating Ozempic appropriately.  No nausea vomiting or diarrhea or constipation.  Neuropathy relatively stable  Objective   Vital Signs:  /60 (BP Location: Left arm, Patient Position: Sitting, Cuff Size: Large Adult)   Pulse 90   Resp 16   Ht 182.9 cm (72\")   Wt 103 kg (227 lb)   SpO2 99%   BMI 30.79 kg/m²   Estimated body mass index is 30.79 kg/m² as calculated from the following:    Height as of this encounter: 182.9 cm (72\").    Weight as of this encounter: 103 kg (227 lb).               Physical Exam  Vitals and nursing note reviewed.   Constitutional:       Appearance: Normal appearance.   HENT:      Head: Normocephalic and atraumatic.   Cardiovascular:      Rate and Rhythm: Normal rate. Rhythm irregular.   Pulmonary:      Effort: Pulmonary effort is normal.      Breath sounds: Normal breath sounds.   Abdominal:      General: Abdomen is flat.      Palpations: Abdomen is soft.   Musculoskeletal:         General: No swelling or deformity.      Cervical back: Neck supple.      Right lower leg: No edema.      Left lower leg: No edema.   Skin:     General: Skin is warm.      Capillary Refill: Capillary refill takes less than 2 seconds.      Findings: No rash.      Comments: Diabetic Foot Exam Performed      Neurological:      General: No focal deficit present.      Mental Status: He is alert and oriented to person, place, and time.      Sensory: Sensory deficit present.        Result Review :          Previous lab reviewed           Assessment and Plan     Diagnoses and all orders for this " visit:    1. Type II diabetes mellitus with complication (Primary)  -     Comprehensive Metabolic Panel  -     Hemoglobin A1c  -     Lipid Panel With / Chol / HDL Ratio  -     TSH    2. Atrial fibrillation, persistent  -     Comprehensive Metabolic Panel  -     Hemoglobin A1c  -     Lipid Panel With / Chol / HDL Ratio  -     TSH    Type II is with suboptimal control.  We did add the Ozempic last time at the lowest dose and is tolerating it well.  Recheck labs and titrate accordingly.  Neuropathy symptoms stable.  Stable atrial fibrillation on anticoagulation.  Tentative follow-up in 3 months or pending lab results         Follow Up     Return in about 3 months (around 10/9/2024) for Recheck.  Patient was given instructions and counseling regarding his condition or for health maintenance advice. Please see specific information pulled into the AVS if appropriate.

## 2024-07-10 LAB
ALBUMIN SERPL-MCNC: 4.4 G/DL (ref 3.5–5.2)
ALBUMIN/GLOB SERPL: 1.7 G/DL
ALP SERPL-CCNC: 62 U/L (ref 39–117)
ALT SERPL-CCNC: 19 U/L (ref 1–41)
AST SERPL-CCNC: 23 U/L (ref 1–40)
BILIRUB SERPL-MCNC: 0.5 MG/DL (ref 0–1.2)
BUN SERPL-MCNC: 23 MG/DL (ref 8–23)
BUN/CREAT SERPL: 22.8 (ref 7–25)
CALCIUM SERPL-MCNC: 9.9 MG/DL (ref 8.6–10.5)
CHLORIDE SERPL-SCNC: 101 MMOL/L (ref 98–107)
CHOLEST SERPL-MCNC: 122 MG/DL (ref 0–200)
CHOLEST/HDLC SERPL: 3.21 {RATIO}
CO2 SERPL-SCNC: 23.7 MMOL/L (ref 22–29)
CREAT SERPL-MCNC: 1.01 MG/DL (ref 0.76–1.27)
EGFRCR SERPLBLD CKD-EPI 2021: 78 ML/MIN/1.73
GLOBULIN SER CALC-MCNC: 2.6 GM/DL
GLUCOSE SERPL-MCNC: 161 MG/DL (ref 65–99)
HBA1C MFR BLD: 8.6 % (ref 4.8–5.6)
HDLC SERPL-MCNC: 38 MG/DL (ref 40–60)
LDLC SERPL CALC-MCNC: 57 MG/DL (ref 0–100)
POTASSIUM SERPL-SCNC: 3.8 MMOL/L (ref 3.5–5.2)
PROT SERPL-MCNC: 7 G/DL (ref 6–8.5)
SODIUM SERPL-SCNC: 140 MMOL/L (ref 136–145)
TRIGL SERPL-MCNC: 157 MG/DL (ref 0–150)
TSH SERPL DL<=0.005 MIU/L-ACNC: 2.48 UIU/ML (ref 0.27–4.2)
VLDLC SERPL CALC-MCNC: 27 MG/DL (ref 5–40)

## 2024-07-10 RX ORDER — SEMAGLUTIDE 0.68 MG/ML
0.5 INJECTION, SOLUTION SUBCUTANEOUS WEEKLY
Qty: 3 ML | Refills: 3 | Status: SHIPPED | OUTPATIENT
Start: 2024-07-10

## 2024-08-24 RX ORDER — SIMVASTATIN 40 MG
TABLET ORAL
Qty: 90 TABLET | Refills: 2 | Status: SHIPPED | OUTPATIENT
Start: 2024-08-24

## 2024-09-13 RX ORDER — RIVAROXABAN 20 MG/1
20 TABLET, FILM COATED ORAL
Qty: 30 TABLET | Refills: 12 | Status: SHIPPED | OUTPATIENT
Start: 2024-09-13

## 2024-10-03 NOTE — TELEPHONE ENCOUNTER
Rx Refill Note  Requested Prescriptions     Pending Prescriptions Disp Refills    amLODIPine (NORVASC) 5 MG tablet [Pharmacy Med Name: AMLODIPINE BESYLATE 5 MG TAB] 90 tablet 1     Sig: TAKE 1 TABLET BY MOUTH DAILY      Last office visit with prescribing clinician: 7/9/2024   Last telemedicine visit with prescribing clinician: Visit date not found   Next office visit with prescribing clinician: 10/11/2024                         Would you like a call back once the refill request has been completed: [] Yes [] No    If the office needs to give you a call back, can they leave a voicemail: [] Yes [] No    Madison Harley MA  10/03/24, 10:20 EDT

## 2024-10-04 RX ORDER — AMLODIPINE BESYLATE 5 MG/1
5 TABLET ORAL DAILY
Qty: 90 TABLET | Refills: 1 | Status: SHIPPED | OUTPATIENT
Start: 2024-10-04

## 2024-10-11 ENCOUNTER — OFFICE VISIT (OUTPATIENT)
Dept: INTERNAL MEDICINE | Facility: CLINIC | Age: 74
End: 2024-10-11
Payer: MEDICARE

## 2024-10-11 VITALS
BODY MASS INDEX: 30.34 KG/M2 | SYSTOLIC BLOOD PRESSURE: 126 MMHG | DIASTOLIC BLOOD PRESSURE: 82 MMHG | WEIGHT: 224 LBS | HEIGHT: 72 IN | HEART RATE: 78 BPM | RESPIRATION RATE: 16 BRPM | OXYGEN SATURATION: 99 % | TEMPERATURE: 96.3 F

## 2024-10-11 DIAGNOSIS — I48.19 ATRIAL FIBRILLATION, PERSISTENT: ICD-10-CM

## 2024-10-11 DIAGNOSIS — I10 PRIMARY HYPERTENSION: ICD-10-CM

## 2024-10-11 DIAGNOSIS — G47.33 OBSTRUCTIVE SLEEP APNEA SYNDROME: ICD-10-CM

## 2024-10-11 DIAGNOSIS — E11.8 TYPE II DIABETES MELLITUS WITH COMPLICATION: Primary | ICD-10-CM

## 2024-10-11 DIAGNOSIS — Z13.6 SCREENING FOR AAA (ABDOMINAL AORTIC ANEURYSM): ICD-10-CM

## 2024-10-11 PROCEDURE — 91320 SARSCV2 VAC 30MCG TRS-SUC IM: CPT | Performed by: INTERNAL MEDICINE

## 2024-10-11 PROCEDURE — 3052F HG A1C>EQUAL 8.0%<EQUAL 9.0%: CPT | Performed by: INTERNAL MEDICINE

## 2024-10-11 PROCEDURE — 90480 ADMN SARSCOV2 VAC 1/ONLY CMP: CPT | Performed by: INTERNAL MEDICINE

## 2024-10-11 PROCEDURE — 99214 OFFICE O/P EST MOD 30 MIN: CPT | Performed by: INTERNAL MEDICINE

## 2024-10-11 PROCEDURE — G0008 ADMIN INFLUENZA VIRUS VAC: HCPCS | Performed by: INTERNAL MEDICINE

## 2024-10-11 PROCEDURE — 3079F DIAST BP 80-89 MM HG: CPT | Performed by: INTERNAL MEDICINE

## 2024-10-11 PROCEDURE — 3074F SYST BP LT 130 MM HG: CPT | Performed by: INTERNAL MEDICINE

## 2024-10-11 PROCEDURE — 90662 IIV NO PRSV INCREASED AG IM: CPT | Performed by: INTERNAL MEDICINE

## 2024-10-11 NOTE — PROGRESS NOTES
"Chief Complaint  Diabetes, Hypertension, and Hyperlipidemia    Subjective        Becki Cruz presents to [facility]  Diabetes    Hypertension    Hyperlipidemia      He has been doing the Ozempic but was some reservation.  He does not really like the shot.  Is not really giving him any significant side effects.  It is very expensive.    No polyuria or polydipsia or visual changes.  No chest pains or shortness of breath.  Taking medication as prescribed.  No side effects reported.   Objective   Vital Signs:  /82 (BP Location: Left arm, Patient Position: Sitting, Cuff Size: Large Adult)   Pulse 78   Temp 96.3 °F (35.7 °C) (Temporal)   Resp 16   Ht 182.9 cm (72\")   Wt 102 kg (224 lb)   SpO2 99%   BMI 30.38 kg/m²   Estimated body mass index is 30.38 kg/m² as calculated from the following:    Height as of this encounter: 182.9 cm (72\").    Weight as of this encounter: 102 kg (224 lb).            Physical Exam  Vitals and nursing note reviewed.   Constitutional:       Appearance: Normal appearance.   HENT:      Head: Normocephalic and atraumatic.   Cardiovascular:      Rate and Rhythm: Normal rate. Rhythm irregular.   Pulmonary:      Effort: Pulmonary effort is normal.      Breath sounds: Normal breath sounds.   Abdominal:      General: Abdomen is flat.      Palpations: Abdomen is soft.   Musculoskeletal:         General: No swelling or deformity.      Cervical back: Neck supple.      Right lower leg: No edema.      Left lower leg: No edema.   Skin:     General: Skin is warm.      Capillary Refill: Capillary refill takes less than 2 seconds.      Findings: No rash.   Neurological:      General: No focal deficit present.      Mental Status: He is alert and oriented to person, place, and time.        Result Review :          Previous notes reviewed     Assessment and Plan   Diagnoses and all orders for this visit:    1. Type II diabetes mellitus with " complication (Primary)  -     Comprehensive Metabolic Panel  -     Hemoglobin A1c  -     Lipid Panel With / Chol / HDL Ratio  -     TSH    2. Screening for AAA (abdominal aortic aneurysm)  -     Abdominal Aortic Aneurysm Screening Medicare CAR; Future    3. Obstructive sleep apnea syndrome    4. Atrial fibrillation, persistent    5. Primary hypertension    Other orders  -     COVID-19 (Pfizer) 12yrs+ (COMIRNATY)  -     Fluzone High-Dose 65+yrs (3342-3822)  -     rivaroxaban (Xarelto) 20 MG tablet; Take 1 tablet by mouth Daily With Dinner.  Dispense: 90 tablet; Refill: 4    Type 2 diabetes with suboptimal control but he did tolerate the Ozempic.  He does not seem to like this shot and does not like the cost of the shot.  Sounds like he wants to discontinue it.  Not sure if the oral Rybelsus would be cheaper but we could certainly try that.  He did not tolerate Jardiance.  Chronic atrial fibrillation stable on anticoagulation and rate control.  COVID and flu shots given.  Has an eye exam coming up next month.  Recheck in 3 months.  Wellness in 6 months       Follow Up   No follow-ups on file.  Patient was given instructions and counseling regarding his condition or for health maintenance advice. Please see specific information pulled into the AVS if appropriate.

## 2024-10-12 LAB
ALBUMIN SERPL-MCNC: 4.3 G/DL (ref 3.5–5.2)
ALBUMIN/GLOB SERPL: 1.8 G/DL
ALP SERPL-CCNC: 68 U/L (ref 39–117)
ALT SERPL-CCNC: 16 U/L (ref 1–41)
AST SERPL-CCNC: 23 U/L (ref 1–40)
BILIRUB SERPL-MCNC: 0.4 MG/DL (ref 0–1.2)
BUN SERPL-MCNC: 13 MG/DL (ref 8–23)
BUN/CREAT SERPL: 13.5 (ref 7–25)
CALCIUM SERPL-MCNC: 10 MG/DL (ref 8.6–10.5)
CHLORIDE SERPL-SCNC: 99 MMOL/L (ref 98–107)
CHOLEST SERPL-MCNC: 132 MG/DL (ref 0–200)
CHOLEST/HDLC SERPL: 3.38 {RATIO}
CO2 SERPL-SCNC: 28.3 MMOL/L (ref 22–29)
CREAT SERPL-MCNC: 0.96 MG/DL (ref 0.76–1.27)
EGFRCR SERPLBLD CKD-EPI 2021: 82.9 ML/MIN/1.73
GLOBULIN SER CALC-MCNC: 2.4 GM/DL
GLUCOSE SERPL-MCNC: 151 MG/DL (ref 65–99)
HBA1C MFR BLD: 7.5 % (ref 4.8–5.6)
HDLC SERPL-MCNC: 39 MG/DL (ref 40–60)
LDLC SERPL CALC-MCNC: 70 MG/DL (ref 0–100)
POTASSIUM SERPL-SCNC: 3.8 MMOL/L (ref 3.5–5.2)
PROT SERPL-MCNC: 6.7 G/DL (ref 6–8.5)
SODIUM SERPL-SCNC: 141 MMOL/L (ref 136–145)
TRIGL SERPL-MCNC: 128 MG/DL (ref 0–150)
TSH SERPL DL<=0.005 MIU/L-ACNC: 2.58 UIU/ML (ref 0.27–4.2)
VLDLC SERPL CALC-MCNC: 23 MG/DL (ref 5–40)

## 2024-10-15 DIAGNOSIS — Z13.6 SCREENING FOR AAA (ABDOMINAL AORTIC ANEURYSM): Primary | ICD-10-CM

## 2024-10-22 RX ORDER — LOSARTAN POTASSIUM 100 MG/1
100 TABLET ORAL DAILY
Qty: 90 TABLET | Refills: 3 | Status: SHIPPED | OUTPATIENT
Start: 2024-10-22

## 2024-11-01 ENCOUNTER — HOSPITAL ENCOUNTER (OUTPATIENT)
Dept: ULTRASOUND IMAGING | Facility: HOSPITAL | Age: 74
Discharge: HOME | End: 2024-11-01
Payer: MEDICARE

## 2024-11-01 DIAGNOSIS — Z13.6 SCREENING FOR AAA (ABDOMINAL AORTIC ANEURYSM): ICD-10-CM

## 2024-11-01 PROCEDURE — 76706 US ABDL AORTA SCREEN AAA: CPT

## 2024-11-04 ENCOUNTER — DOCUMENTATION (OUTPATIENT)
Dept: SLEEP MEDICINE | Facility: HOSPITAL | Age: 74
End: 2024-11-04
Payer: MEDICARE

## 2024-11-04 ENCOUNTER — OFFICE VISIT (OUTPATIENT)
Dept: SLEEP MEDICINE | Facility: HOSPITAL | Age: 74
End: 2024-11-04
Payer: MEDICARE

## 2024-11-04 VITALS
BODY MASS INDEX: 30.61 KG/M2 | SYSTOLIC BLOOD PRESSURE: 131 MMHG | HEIGHT: 72 IN | WEIGHT: 226 LBS | OXYGEN SATURATION: 98 % | DIASTOLIC BLOOD PRESSURE: 76 MMHG | HEART RATE: 76 BPM

## 2024-11-04 DIAGNOSIS — Z78.9 DIFFICULTY WITH CPAP USE: ICD-10-CM

## 2024-11-04 DIAGNOSIS — E66.9 OBESITY (BMI 30-39.9): ICD-10-CM

## 2024-11-04 DIAGNOSIS — G47.33 OBSTRUCTIVE SLEEP APNEA, ADULT: Primary | ICD-10-CM

## 2024-11-04 DIAGNOSIS — R68.2 DRY MOUTH: ICD-10-CM

## 2024-11-04 PROCEDURE — G0463 HOSPITAL OUTPT CLINIC VISIT: HCPCS

## 2024-11-04 NOTE — PROGRESS NOTES
[facility] [place] [facility]  [address]  [place] [address]  [phone]    PCP: Esau Munson MD (Tony)    Reason for visit:  Sleep disorders: CAROL    Becki is a 74 y.o.male who was seen in the [facility] today. Annual fu. He is compliant and now used to device. Using nasal cushion, gets some dry mouth. Sleeps from 10pm to 8am. Wakes up more rested with the device.  [place] Sleepiness Scale is 3. Caffeine 2 per day. Alcohol 0 per week.    Becki  reports that he has quit smoking. He has never used smokeless tobacco.    Pertinent Positive Review of Systems of denies  Rest of Review of Systems was negative as recorded in Sleep Questionnaire.    Patient  has a past medical history of Abnormal ECG (10-), Actinic keratosis, Atrial fibrillation, persistent (11/21/2022), Cellulitis, unspecified, Diabetic eye exam, Essential (primary) hypertension, Laceration without foreign body, right lower leg, initial encounter, Mixed hyperlipidemia, Osteoarthritis of first carpometacarpal joint, unspecified, Seborrheic keratosis, Sleep apnea (2010), Type 2 diabetes mellitus without complication, and Underimmunization status.     Current Medications:    Current Outpatient Medications:     amLODIPine (NORVASC) 5 MG tablet, TAKE 1 TABLET BY MOUTH DAILY, Disp: 90 tablet, Rfl: 1    brimonidine (ALPHAGAN) 0.2 % ophthalmic solution, 1 drop 3 (Three) Times a Day., Disp: , Rfl:     glipizide (GLUCOTROL XL) 10 MG 24 hr tablet, TAKE ONE TABLET BY MOUTH DAILY, Disp: 90 tablet, Rfl: 4    hydroCHLOROthiazide 25 MG tablet, TAKE 1 TABLET BY MOUTH DAILY, Disp: 90 tablet, Rfl: 1    Latanoprost 0.005 % emulsion, Apply  to eye(s) as directed by provider. (Patient not taking: Reported on 8/23/2023), Disp: , Rfl:     losartan (COZAAR) 100 MG tablet, TAKE 1 TABLET BY MOUTH DAILY, Disp: 90 tablet, Rfl: 3    metFORMIN (GLUCOPHAGE) 1000 MG tablet, TAKE 1 TABLET BY MOUTH TWICE A DAY WITH A MEAL, Disp: 180  "tablet, Rfl: 2    metoprolol succinate XL (TOPROL-XL) 25 MG 24 hr tablet, TAKE ONE TABLET BY MOUTH DAILY, Disp: 90 tablet, Rfl: 4    multivitamin with minerals tablet tablet, Take 1 tablet by mouth Daily., Disp: , Rfl:     rivaroxaban (Xarelto) 20 MG tablet, Take 1 tablet by mouth Daily With Dinner., Disp: 90 tablet, Rfl: 4    Saw Palmetto 450 MG capsule, Take 450 mg by mouth., Disp: , Rfl:     simvastatin (ZOCOR) 40 MG tablet, TAKE ONE TABLET BY MOUTH ONCE NIGHTLY, Disp: 90 tablet, Rfl: 2   also entered in Sleep Questionnaire         Vital Signs: /76   Pulse 76   Ht 182.9 cm (72.01\")   Wt 103 kg (226 lb)   SpO2 98%   BMI 30.64 kg/m²     Body mass index is 30.64 kg/m².       Tongue: Normal       Dentition: good       Pharynx: Posterior pharyngeal pillars are unable   Mallampatti: IV (only hard palate visible)        General: Alert. Cooperative. Well developed. No acute distress.             Head:  Normocephalic. Symmetrical. Atraumatic.              Nose: No septal deviation. No drainage.          Throat: No oral lesions. No thrush. Moist mucous membranes.    Chest Wall:  Normal shape. Symmetric expansion with respiration. No tenderness.             Neck:  Trachea midline.           Lungs:  Clear to auscultation bilaterally. No wheezes. No rhonchi. No rales. Respirations regular, even and unlabored.            Heart:  Regular rhythm and normal rate. Normal S1 and S2. No murmur.     Abdomen:  Soft, non-tender and non-distended. Normal bowel sounds. No masses.  Extremities:  Moves all extremities well. No edema.    Psychiatric: Normal mood and affect.    Diagnostic data available to date is as below and was reviewed on current visit:  Jan 2023-Home Sleep Study Findings:   Recording start time 10:11 PM and recording end time 7:37 AM. Total recording time 566.6 minutes and monitoring time 563.1 minutes. The patient had 2 obstructive events and 84 partial obstructive events. AHI for total sleep time is 9.2 " "Lowest oxygen saturation is 80%.    No results found for: \"IRON\", \"TIBC\", \"FERRITIN\"    Most current available usage data reviewed on 11/04/2024:  No scans are attached to the encounter or orders placed in the encounter.  100% compliance average 8-1/2 hours AHI 0.9 average pressure 8 cm with auto CPAP set 5-15    DME Company: [employer]    [place] [facility] [place]:    nasal mask      Description Replacement    Nasal PILLOWS      A 7034 Nasal Pillows  every 3 mth    A 7033 Repl Nasal Pillows  2 per mth    Nasal MASK/CUSHION     x A 7034 Nasal Mask/Cushion  every 3 mth   x A 7032 Repl Nasal Mask/Cushion  2 per mth    Full Face MASK      A 7030 Full Face Mask  every 3 mth    A 7031 Repl Face Mask  1 per mth      A 4604 Heated Tubing  every 3 mth    A 7037 Standard Tubing  every 3 mth   x A 7035 Headgear  every 3 mth   x A 7046 Repl Humidifier Chamber  every 6 yrs   x A 7038 Disposable Filters  2 per mth   x A 7039 Non-disposable Filter  every 6 mth   x A 7036 Chin Strap  every 6 mth     No orders of the defined types were placed in this encounter.         Impression:  1. Obstructive sleep apnea, adult    2. Obesity (BMI 30-39.9)    3. Difficulty with CPAP use    4. Dry mouth        Plan:  Becki is compliant however sometimes having dry mouth.  His average pressure is 8 cm and I will adjust his CPAP machine down to auto CPAP 5-10 cm.  Hopefully this will provide some relief.  Otherwise he is doing well and was asked to continue with excellent compliance with his machine.    I reiterated the importance of effective treatment of obstructive sleep apnea with PAP machine.  Cardiovascular health risks of untreated sleep apnea were again reviewed.  Patient was asked to remain cautious if there is persistent hypersomnolence. The benefit of weight loss in reducing severity of obstructive sleep apnea was discussed.  Patient would benefit from adhering to a strict diet to achieve ideal BMI.     Change of " PAP supplies regularly is important for effective use.  Change of cushion on the mask or plugs on nasal pillows along with disposable filters once every month and change of mask frame, tubing, headgear and Velcro straps every 6 months at the minimum was reiterated.    This patient is compliant with PAP machine and benefits from its use.  Apnea hypopneas index is corrected/improved.  Daytime hypersomnolence has resolved.     Patient will follow up in this clinic in 1 year aprn    Thank you for allowing me to participate in your patient's care.    Electronically signed by Best English MD, 11/04/24, 10:01 AM EST.    Part of this note may be an electronic transcription/translation of spoken language to printed text using the Dragon Dictation System.

## 2024-11-04 NOTE — PROGRESS NOTES
Pressure Changes made in [facility]VIEW in office per Dr. English:    11/04/2024, 07:10 AM    by Km Sandoval    Settings sent to device    Request [id number]    Set Mode to AutoSet  Set Min Pressure to 5 cmH2O  Set Max Pressure to 10 cmH2O  Set Response to Standard  Set EPR to Off  Set Ramp enable to Auto  Set Start pressure to 4.0 cmH2O  Set Humidifier level to 5  05/04/2024, 11:56 AM    Settings confirmed on device    Set Mode to AutoSet  Set Min Pressure to 6 cmH2O  Set Max Pressure to 15 cmH2O  Set Response to Standard  Set EPR to Off  Set Ramp enable to Auto  Set Start pressure to 4.0 cmH2O  Set Humidifier level to 5

## 2024-11-16 RX ORDER — HYDROCHLOROTHIAZIDE 25 MG/1
25 TABLET ORAL DAILY
Qty: 90 TABLET | Refills: 1 | Status: SHIPPED | OUTPATIENT
Start: 2024-11-16

## 2024-12-16 ENCOUNTER — TELEPHONE (OUTPATIENT)
Dept: INTERNAL MEDICINE | Facility: CLINIC | Age: 74
End: 2024-12-16
Payer: MEDICARE

## 2025-01-13 ENCOUNTER — OFFICE VISIT (OUTPATIENT)
Dept: INTERNAL MEDICINE | Facility: CLINIC | Age: 75
End: 2025-01-13
Payer: MEDICARE

## 2025-01-13 VITALS
HEART RATE: 78 BPM | RESPIRATION RATE: 12 BRPM | SYSTOLIC BLOOD PRESSURE: 142 MMHG | HEIGHT: 72 IN | OXYGEN SATURATION: 98 % | DIASTOLIC BLOOD PRESSURE: 90 MMHG | BODY MASS INDEX: 30.88 KG/M2 | TEMPERATURE: 96.6 F | WEIGHT: 228 LBS

## 2025-01-13 DIAGNOSIS — I48.19 ATRIAL FIBRILLATION, PERSISTENT: ICD-10-CM

## 2025-01-13 DIAGNOSIS — E11.8 TYPE II DIABETES MELLITUS WITH COMPLICATION: Primary | ICD-10-CM

## 2025-01-13 DIAGNOSIS — G47.33 OBSTRUCTIVE SLEEP APNEA SYNDROME: ICD-10-CM

## 2025-01-13 PROCEDURE — 3077F SYST BP >= 140 MM HG: CPT | Performed by: INTERNAL MEDICINE

## 2025-01-13 PROCEDURE — G2211 COMPLEX E/M VISIT ADD ON: HCPCS | Performed by: INTERNAL MEDICINE

## 2025-01-13 PROCEDURE — 3080F DIAST BP >= 90 MM HG: CPT | Performed by: INTERNAL MEDICINE

## 2025-01-13 PROCEDURE — 99214 OFFICE O/P EST MOD 30 MIN: CPT | Performed by: INTERNAL MEDICINE

## 2025-01-13 NOTE — PROGRESS NOTES
"Chief Complaint  Hypertension and Diabetes    Subjective        Becki Cruz presents to [facility]  Hypertension    Diabetes      Denies any significant headache, shortness of breath or chest pain.  No visual changes.  Taking medication without complication.   He took the Ozempic but for some reason never continued taking it.  He is not sure what happened.  We did not discontinue it.  He thought maybe the insurance required a 3-month mail away prescription but that was never done.  He does not sound very excited about restarting it  Objective   Vital Signs:  /90 (BP Location: Left arm, Patient Position: Sitting, Cuff Size: Large Adult)   Pulse 78   Temp 96.6 °F (35.9 °C) (Temporal)   Resp 12   Ht 182.9 cm (72\")   Wt 103 kg (228 lb)   SpO2 98%   BMI 30.92 kg/m²   Estimated body mass index is 30.92 kg/m² as calculated from the following:    Height as of this encounter: 182.9 cm (72\").    Weight as of this encounter: 103 kg (228 lb).          Physical Exam  Vitals and nursing note reviewed.   Constitutional:       Appearance: Normal appearance.   HENT:      Head: Normocephalic and atraumatic.   Cardiovascular:      Rate and Rhythm: Normal rate. Rhythm irregular.   Pulmonary:      Effort: Pulmonary effort is normal.      Breath sounds: Normal breath sounds.   Abdominal:      General: Abdomen is flat.      Palpations: Abdomen is soft.   Musculoskeletal:         General: No swelling or deformity.      Cervical back: Neck supple.      Right lower leg: No edema.      Left lower leg: No edema.   Skin:     General: Skin is warm.      Capillary Refill: Capillary refill takes less than 2 seconds.      Findings: No rash.   Neurological:      General: No focal deficit present.      Mental Status: He is alert and oriented to person, place, and time.        Result Review :    CMP          4/9/2024    10:41 7/9/2024    10:19 10/11/2024    09:36   [facility]   Glucose 205  161  " 151    BUN 17  23  13    Creatinine 1.09  1.01  0.96    Sodium 139  140  141    Potassium 4.3  3.8  3.8    Chloride 99  101  99    Calcium 10.4  9.9  10.0    Total Protein 7.4  7.0  6.7    Albumin 4.7  4.4  4.3    Globulin 2.7  2.6  2.4    Total Bilirubin 0.5  0.5  0.4    Alkaline Phosphatase 77  62  68    AST (SGOT) 20  23  23    ALT (SGPT) 17  19  16    BUN/Creatinine Ratio 15.6  22.8  13.5      Lipid Panel          4/9/2024    10:41 7/9/2024    10:19 10/11/2024    09:36   Lipid Panel   Total Cholesterol 133  122  132    Triglycerides 141  157  128    HDL Cholesterol 42  38  39    VLDL Cholesterol 25  27  23    LDL Cholesterol  66  57  70      A1C Last 3 Results          4/9/2024    10:41 7/9/2024    10:19 10/11/2024    09:36   HGBA1C Last 3 Results   Hemoglobin A1C 8.90  8.60  7.50                Assessment and Plan   Diagnoses and all orders for this visit:    1. Type II diabetes mellitus with complication (Primary)  -     Comprehensive Metabolic Panel  -     Hemoglobin A1c  -     Lipid Panel With / Chol / HDL Ratio    2. Atrial fibrillation, persistent    3. Obstructive sleep apnea syndrome    Type 2 diabetes overall control was better last time but has been off the Ozempic now.  He does not sound interested in going back on it.  If A1c is elevated we could try the Rybelsus assuming insurance coverage.  Chronic atrial fibrillation stable on anticoagulation and rate control.  Physically has been doing pretty well and has no other significant complaints.  Mild blood pressure elevation today but no changes in medication.  Will recheck next visit       Follow Up   Return in about 3 months (around 4/13/2025).  Patient was given instructions and counseling regarding his condition or for health maintenance advice. Please see specific information pulled into the AVS if appropriate.

## 2025-01-14 LAB
ALBUMIN SERPL-MCNC: 4.5 G/DL (ref 3.5–5.2)
ALBUMIN/GLOB SERPL: 1.8 G/DL
ALP SERPL-CCNC: 62 U/L (ref 39–117)
ALT SERPL-CCNC: 16 U/L (ref 1–41)
AST SERPL-CCNC: 17 U/L (ref 1–40)
BILIRUB SERPL-MCNC: 0.4 MG/DL (ref 0–1.2)
BUN SERPL-MCNC: 13 MG/DL (ref 8–23)
BUN/CREAT SERPL: 14.6 (ref 7–25)
CALCIUM SERPL-MCNC: 9.7 MG/DL (ref 8.6–10.5)
CHLORIDE SERPL-SCNC: 102 MMOL/L (ref 98–107)
CHOLEST SERPL-MCNC: 113 MG/DL (ref 0–200)
CHOLEST/HDLC SERPL: 2.76 {RATIO}
CO2 SERPL-SCNC: 28.2 MMOL/L (ref 22–29)
CREAT SERPL-MCNC: 0.89 MG/DL (ref 0.76–1.27)
EGFRCR SERPLBLD CKD-EPI 2021: 89.9 ML/MIN/1.73
GLOBULIN SER CALC-MCNC: 2.5 GM/DL
GLUCOSE SERPL-MCNC: 199 MG/DL (ref 65–99)
HBA1C MFR BLD: 8.5 % (ref 4.8–5.6)
HDLC SERPL-MCNC: 41 MG/DL (ref 40–60)
LDLC SERPL CALC-MCNC: 51 MG/DL (ref 0–100)
POTASSIUM SERPL-SCNC: 4 MMOL/L (ref 3.5–5.2)
PROT SERPL-MCNC: 7 G/DL (ref 6–8.5)
SODIUM SERPL-SCNC: 138 MMOL/L (ref 136–145)
TRIGL SERPL-MCNC: 112 MG/DL (ref 0–150)
VLDLC SERPL CALC-MCNC: 21 MG/DL (ref 5–40)

## 2025-01-25 RX ORDER — GLIPIZIDE 10 MG/1
10 TABLET, FILM COATED, EXTENDED RELEASE ORAL DAILY
Qty: 90 TABLET | Refills: 4 | Status: SHIPPED | OUTPATIENT
Start: 2025-01-25

## 2025-04-01 RX ORDER — AMLODIPINE BESYLATE 5 MG/1
5 TABLET ORAL DAILY
Qty: 90 TABLET | Refills: 1 | Status: SHIPPED | OUTPATIENT
Start: 2025-04-01

## 2025-04-11 ENCOUNTER — OFFICE VISIT (OUTPATIENT)
Dept: INTERNAL MEDICINE | Facility: CLINIC | Age: 75
End: 2025-04-11
Payer: MEDICARE

## 2025-04-11 VITALS
TEMPERATURE: 96.3 F | DIASTOLIC BLOOD PRESSURE: 86 MMHG | SYSTOLIC BLOOD PRESSURE: 148 MMHG | RESPIRATION RATE: 16 BRPM | WEIGHT: 224 LBS | BODY MASS INDEX: 30.34 KG/M2 | HEART RATE: 70 BPM | OXYGEN SATURATION: 99 % | HEIGHT: 72 IN

## 2025-04-11 DIAGNOSIS — Z00.00 ROUTINE GENERAL MEDICAL EXAMINATION AT A HEALTH CARE FACILITY: Primary | ICD-10-CM

## 2025-04-11 DIAGNOSIS — Z12.5 SPECIAL SCREENING FOR MALIGNANT NEOPLASM OF PROSTATE: ICD-10-CM

## 2025-04-11 DIAGNOSIS — I48.19 ATRIAL FIBRILLATION, PERSISTENT: ICD-10-CM

## 2025-04-11 DIAGNOSIS — I10 PRIMARY HYPERTENSION: ICD-10-CM

## 2025-04-11 DIAGNOSIS — E11.8 TYPE II DIABETES MELLITUS WITH COMPLICATION: ICD-10-CM

## 2025-04-11 NOTE — PROGRESS NOTES
Subjective   The ABCs of the Annual Wellness Visit  Medicare Wellness Visit      Becki Cruz is a 74 y.o. patient who presents for a Medicare Wellness Visit.    The following portions of the patient's history were reviewed and   updated as appropriate: allergies, current medications, past family history, past medical history, past social history, past surgical history, and problem list.    Compared to one year ago, the patient's physical   health is the same.  Compared to one year ago, the patient's mental   health is the same.    Recent Hospitalizations:  He was not admitted to the hospital during the last year.     Current Medical Providers:  Patient Care Team:  Esau Munson MD (Tony) as PCP - General (Internal Medicine)    Outpatient Medications Prior to Visit   Medication Sig Dispense Refill    amLODIPine (NORVASC) 5 MG tablet TAKE 1 TABLET BY MOUTH DAILY 90 tablet 1    brimonidine (ALPHAGAN) 0.2 % ophthalmic solution 1 drop 3 (Three) Times a Day.      glipizide (GLUCOTROL XL) 10 MG 24 hr tablet TAKE 1 TABLET BY MOUTH DAILY 90 tablet 4    hydroCHLOROthiazide 25 MG tablet TAKE 1 TABLET BY MOUTH DAILY 90 tablet 1    losartan (COZAAR) 100 MG tablet TAKE 1 TABLET BY MOUTH DAILY 90 tablet 3    metFORMIN (GLUCOPHAGE) 1000 MG tablet TAKE 1 TABLET BY MOUTH TWICE A DAY WITH A MEAL 180 tablet 2    metoprolol succinate XL (TOPROL-XL) 25 MG 24 hr tablet TAKE ONE TABLET BY MOUTH DAILY 90 tablet 4    multivitamin with minerals tablet tablet Take 1 tablet by mouth Daily.      rivaroxaban (Xarelto) 20 MG tablet Take 1 tablet by mouth Daily With Dinner. 90 tablet 4    Saw Palmetto 450 MG capsule Take 450 mg by mouth.      simvastatin (ZOCOR) 40 MG tablet TAKE ONE TABLET BY MOUTH ONCE NIGHTLY 90 tablet 2    Semaglutide 3 MG tablet Take 1 tablet by mouth Daily. (Patient not taking: Reported on 4/11/2025) 30 tablet 3     No facility-administered medications prior to visit.     No opioid medication identified on active  "medication list. I have reviewed chart for other potential  high risk medication/s and harmful drug interactions in the elderly.      Aspirin is not on active medication list.  Aspirin use is not indicated based on review of current medical condition/s. Risk of harm outweighs potential benefits.  .    Patient Active Problem List   Diagnosis    Hypertension    Atrial fibrillation, persistent    Sleep apnea    Type II diabetes mellitus with complication     Advance Care Planning Advance Directive is not on file.  ACP discussion was held with the patient during this visit. Patient has an advance directive (not in EMR), copy requested.            Objective   Vitals:    04/11/25 0805   BP: 148/86   BP Location: Left arm   Patient Position: Sitting   Cuff Size: Large Adult   Pulse: 70   Resp: 16   Temp: 96.3 °F (35.7 °C)   TempSrc: Temporal   SpO2: 99%   Weight: 102 kg (224 lb)   Height: 182.9 cm (72\")       Estimated body mass index is 30.38 kg/m² as calculated from the following:    Height as of this encounter: 182.9 cm (72\").    Weight as of this encounter: 102 kg (224 lb).                Does the patient have evidence of cognitive impairment? No  Lab Results   Component Value Date    CHLPL 113 01/13/2025    TRIG 112 01/13/2025    HDL 41 01/13/2025    LDL 51 01/13/2025    VLDL 21 01/13/2025    HGBA1C 8.50 (H) 01/13/2025                                                                                                Health  Risk Assessment    Smoking Status:  Social History     Tobacco Use   Smoking Status Former   Smokeless Tobacco Never     Alcohol Consumption:  Social History     Substance and Sexual Activity   Alcohol Use Never    Comment: UNKNOWN       Fall Risk Screen  STEADI Fall Risk Assessment has not been completed.    Depression Screening   Little interest or pleasure in doing things? Not at all   Feeling down, depressed, or hopeless? Not at all   PHQ-2 Total Score 0      Health Habits and Functional and " Cognitive Screenin/11/2025     8:09 AM   Functional & Cognitive Status   Do you have difficulty preparing food and eating? No   Do you have difficulty bathing yourself, getting dressed or grooming yourself? No   Do you have difficulty using the toilet? No   Do you have difficulty moving around from place to place? No   Do you have trouble with steps or getting out of a bed or a chair? No   Current Diet Unhealthy Diet   Dental Exam Up to date   Eye Exam Up to date   Exercise (times per week) 7 times per week   Current Exercises Include Walking;Treadmill   Do you need help using the phone?  No   Are you deaf or do you have serious difficulty hearing?  No   Do you need help to go to places out of walking distance? No   Do you need help shopping? No   Do you need help preparing meals?  No   Do you need help with housework?  No   Do you need help with laundry? No   Do you need help taking your medications? No   Do you need help managing money? No   Do you ever drive or ride in a car without wearing a seat belt? No   Have you felt unusual stress, anger or loneliness in the last month? No   Who do you live with? Spouse   If you need help, do you have trouble finding someone available to you? No   Have you been bothered in the last four weeks by sexual problems? No           Age-appropriate Screening Schedule:  Refer to the list below for future screening recommendations based on patient's age, sex and/or medical conditions. Orders for these recommended tests are listed in the plan section. The patient has been provided with a written plan.    Health Maintenance List  Health Maintenance   Topic Date Due    URINE MICROALBUMIN-CREATININE RATIO (uACR)  2024    ANNUAL WELLNESS VISIT  2025    COVID-19 Vaccine (2024- season) 2025    COLORECTAL CANCER SCREENING  2025    ZOSTER VACCINE (1 of 2) 10/08/2025 (Originally 2000)    Pneumococcal Vaccine 50+ (2 of 2 - PCV) 10/11/2025 (Originally  "10/24/2019)    INFLUENZA VACCINE  07/01/2025    DIABETIC FOOT EXAM  07/09/2025    HEMOGLOBIN A1C  07/13/2025    DIABETIC EYE EXAM  11/01/2025    LIPID PANEL  01/13/2026    TDAP/TD VACCINES (2 - Td or Tdap) 12/31/2032    HEPATITIS C SCREENING  Completed    AAA SCREEN ONCE  Completed                                                                                                                                                CMS Preventative Services Quick Reference  Risk Factors Identified During Encounter  Immunizations Discussed/Encouraged: Shingrix and COVID19    The above risks/problems have been discussed with the patient.  Pertinent information has been shared with the patient in the After Visit Summary.  An After Visit Summary and PPPS were made available to the patient.    Follow Up:   Next Medicare Wellness visit to be scheduled in 1 year.         Additional E&M Note during same encounter follows:  Patient has additional, significant, and separately identifiable condition(s)/problem(s) that require work above and beyond the Medicare Wellness Visit     Chief Complaint  Medicare Wellness-subsequent    Subjective   HPI  Becki is also being seen today for additional medical problem/s.  Follow-up of type 2 diabetes.  He did not get the Rybelsus due to cost.  Since then he has been exercising nearly every day on the treadmill.  He feels good with no complaints.  He did strain his right knee recently so that slowed him down somewhat              Objective   Vital Signs:  /86 (BP Location: Left arm, Patient Position: Sitting, Cuff Size: Large Adult)   Pulse 70   Temp 96.3 °F (35.7 °C) (Temporal)   Resp 16   Ht 182.9 cm (72\")   Wt 102 kg (224 lb)   SpO2 99%   BMI 30.38 kg/m²   Physical Exam  Vitals and nursing note reviewed.   Constitutional:       Appearance: Normal appearance.   HENT:      Head: Normocephalic and atraumatic.   Cardiovascular:      Rate and Rhythm: Normal rate. Rhythm irregular. "   Pulmonary:      Effort: Pulmonary effort is normal.      Breath sounds: Normal breath sounds.   Abdominal:      General: Abdomen is flat.      Palpations: Abdomen is soft.   Musculoskeletal:         General: No swelling or deformity.      Cervical back: Neck supple.      Right lower leg: No edema.      Left lower leg: No edema.   Skin:     General: Skin is warm.      Capillary Refill: Capillary refill takes less than 2 seconds.      Findings: No rash.   Neurological:      General: No focal deficit present.      Mental Status: He is alert and oriented to person, place, and time.                    Assessment and Plan      Primary hypertension      Orders:    CBC & Differential    Comprehensive Metabolic Panel    Lipid Panel With / Chol / HDL Ratio    TSH    PSA Screen    Hemoglobin A1c    Urinalysis With Microscopic - Urine, Clean Catch    MicroAlbumin, Urine, Random - Urine, Clean Catch    Atrial fibrillation, persistent    Orders:    CBC & Differential    Comprehensive Metabolic Panel    Lipid Panel With / Chol / HDL Ratio    TSH    PSA Screen    Hemoglobin A1c    Urinalysis With Microscopic - Urine, Clean Catch    MicroAlbumin, Urine, Random - Urine, Clean Catch    Type II diabetes mellitus with complication      Orders:    CBC & Differential    Comprehensive Metabolic Panel    Lipid Panel With / Chol / HDL Ratio    TSH    PSA Screen    Hemoglobin A1c    Urinalysis With Microscopic - Urine, Clean Catch    MicroAlbumin, Urine, Random - Urine, Clean Catch    Special screening for malignant neoplasm of prostate    Orders:    CBC & Differential    Comprehensive Metabolic Panel    Lipid Panel With / Chol / HDL Ratio    TSH    PSA Screen    Hemoglobin A1c    Urinalysis With Microscopic - Urine, Clean Catch    MicroAlbumin, Urine, Random - Urine, Clean Catch    Routine general medical examination at a health care facility       Very nice gentleman here for wellness exam.  Overall is doing good.  No real complaints  other than he strained his right knee recently and that slowed him down on the treadmill somewhat.  Feels like his blood sugars doing better.  He did not get the Rybelsus due to the cost.  Recheck lab work including A1c and titrate accordingly.  Would be a good GLP-1 agonist candidate but not interested in that.  Continue with the exercise and diet and optimally that would be the best way for the blood sugar treatment.  A-fib stable on anticoagulation and rate control.  He feels like his exercise tolerance is good.  Tam ordered next visit.          Follow Up   Return in about 3 months (around 7/11/2025) for Recheck.  Patient was given instructions and counseling regarding his condition or for health maintenance advice. Please see specific information pulled into the AVS if appropriate.

## 2025-04-11 NOTE — ASSESSMENT & PLAN NOTE
Orders:    CBC & Differential    Comprehensive Metabolic Panel    Lipid Panel With / Chol / HDL Ratio    TSH    PSA Screen    Hemoglobin A1c    Urinalysis With Microscopic - Urine, Clean Catch    MicroAlbumin, Urine, Random - Urine, Clean Catch

## 2025-04-12 LAB
ALBUMIN SERPL-MCNC: 4.8 G/DL (ref 3.8–4.8)
ALP SERPL-CCNC: 73 IU/L (ref 44–121)
ALT SERPL-CCNC: 17 IU/L (ref 0–44)
APPEARANCE UR: CLEAR
AST SERPL-CCNC: 22 IU/L (ref 0–40)
BACTERIA #/AREA URNS HPF: NORMAL /[HPF]
BASOPHILS # BLD AUTO: 0 X10E3/UL (ref 0–0.2)
BASOPHILS NFR BLD AUTO: 1 %
BILIRUB SERPL-MCNC: 0.5 MG/DL (ref 0–1.2)
BILIRUB UR QL STRIP: NEGATIVE
BUN SERPL-MCNC: 17 MG/DL (ref 8–27)
BUN/CREAT SERPL: 18 (ref 10–24)
CALCIUM SERPL-MCNC: 10.3 MG/DL (ref 8.6–10.2)
CASTS URNS QL MICRO: NORMAL /LPF
CHLORIDE SERPL-SCNC: 97 MMOL/L (ref 96–106)
CHOLEST SERPL-MCNC: 132 MG/DL (ref 100–199)
CHOLEST/HDLC SERPL: 3.4 RATIO (ref 0–5)
CO2 SERPL-SCNC: 24 MMOL/L (ref 20–29)
COLOR UR: YELLOW
CREAT SERPL-MCNC: 0.93 MG/DL (ref 0.76–1.27)
EGFRCR SERPLBLD CKD-EPI 2021: 86 ML/MIN/1.73
EOSINOPHIL # BLD AUTO: 0.2 X10E3/UL (ref 0–0.4)
EOSINOPHIL NFR BLD AUTO: 2 %
EPI CELLS #/AREA URNS HPF: NORMAL /HPF (ref 0–10)
ERYTHROCYTE [DISTWIDTH] IN BLOOD BY AUTOMATED COUNT: 12.9 % (ref 11.6–15.4)
GLOBULIN SER CALC-MCNC: 2.6 G/DL (ref 1.5–4.5)
GLUCOSE SERPL-MCNC: 182 MG/DL (ref 70–99)
GLUCOSE UR QL STRIP: NEGATIVE
HBA1C MFR BLD: 9.3 % (ref 4.8–5.6)
HCT VFR BLD AUTO: 43.2 % (ref 37.5–51)
HDLC SERPL-MCNC: 39 MG/DL
HGB BLD-MCNC: 14.1 G/DL (ref 13–17.7)
HGB UR QL STRIP: NEGATIVE
IMM GRANULOCYTES # BLD AUTO: 0 X10E3/UL (ref 0–0.1)
IMM GRANULOCYTES NFR BLD AUTO: 0 %
KETONES UR QL STRIP: NEGATIVE
LDLC SERPL CALC-MCNC: 70 MG/DL (ref 0–99)
LEUKOCYTE ESTERASE UR QL STRIP: NEGATIVE
LYMPHOCYTES # BLD AUTO: 3.4 X10E3/UL (ref 0.7–3.1)
LYMPHOCYTES NFR BLD AUTO: 39 %
MCH RBC QN AUTO: 28.6 PG (ref 26.6–33)
MCHC RBC AUTO-ENTMCNC: 32.6 G/DL (ref 31.5–35.7)
MCV RBC AUTO: 88 FL (ref 79–97)
MICRO URNS: NORMAL
MICRO URNS: NORMAL
MICROALBUMIN UR-MCNC: 14.4 UG/ML
MONOCYTES # BLD AUTO: 0.7 X10E3/UL (ref 0.1–0.9)
MONOCYTES NFR BLD AUTO: 8 %
NEUTROPHILS # BLD AUTO: 4.5 X10E3/UL (ref 1.4–7)
NEUTROPHILS NFR BLD AUTO: 50 %
NITRITE UR QL STRIP: NEGATIVE
PH UR STRIP: 5.5 [PH] (ref 5–7.5)
PLATELET # BLD AUTO: 292 X10E3/UL (ref 150–450)
POTASSIUM SERPL-SCNC: 4.1 MMOL/L (ref 3.5–5.2)
PROT SERPL-MCNC: 7.4 G/DL (ref 6–8.5)
PROT UR QL STRIP: NEGATIVE
PSA SERPL-MCNC: 8.5 NG/ML (ref 0–4)
RBC # BLD AUTO: 4.93 X10E6/UL (ref 4.14–5.8)
RBC #/AREA URNS HPF: NORMAL /HPF (ref 0–2)
SODIUM SERPL-SCNC: 139 MMOL/L (ref 134–144)
SP GR UR STRIP: 1.01 (ref 1–1.03)
TRIGL SERPL-MCNC: 126 MG/DL (ref 0–149)
TSH SERPL DL<=0.005 MIU/L-ACNC: 2.58 UIU/ML (ref 0.45–4.5)
UROBILINOGEN UR STRIP-MCNC: 0.2 MG/DL (ref 0.2–1)
VLDLC SERPL CALC-MCNC: 23 MG/DL (ref 5–40)
WBC # BLD AUTO: 8.7 X10E3/UL (ref 3.4–10.8)
WBC #/AREA URNS HPF: NORMAL /HPF (ref 0–5)

## 2025-05-06 RX ORDER — METOPROLOL SUCCINATE 25 MG/1
25 TABLET, EXTENDED RELEASE ORAL DAILY
Qty: 90 TABLET | Refills: 4 | Status: SHIPPED | OUTPATIENT
Start: 2025-05-06

## 2025-05-12 RX ORDER — HYDROCHLOROTHIAZIDE 25 MG/1
25 TABLET ORAL DAILY
Qty: 90 TABLET | Refills: 1 | Status: SHIPPED | OUTPATIENT
Start: 2025-05-12

## 2025-05-13 RX ORDER — SIMVASTATIN 40 MG
40 TABLET ORAL NIGHTLY
Qty: 90 TABLET | Refills: 2 | Status: SHIPPED | OUTPATIENT
Start: 2025-05-13

## 2025-05-13 NOTE — TELEPHONE ENCOUNTER
Rx Refill Note  Requested Prescriptions     Pending Prescriptions Disp Refills    simvastatin (ZOCOR) 40 MG tablet [Pharmacy Med Name: SIMVASTATIN 40 MG TABLET] 90 tablet 2     Sig: TAKE ONE TABLET BY MOUTH ONCE NIGHTLY      Last office visit with prescribing clinician: 4/11/2025   Last telemedicine visit with prescribing clinician: Visit date not found   Next office visit with prescribing clinician: 7/22/2025                         Would you like a call back once the refill request has been completed: [] Yes [] No    If the office needs to give you a call back, can they leave a voicemail: [] Yes [] No    Madison Harley MA  05/13/25, 12:33 EDT

## 2025-07-22 ENCOUNTER — OFFICE VISIT (OUTPATIENT)
Dept: INTERNAL MEDICINE | Facility: CLINIC | Age: 75
End: 2025-07-22
Payer: MEDICARE

## 2025-07-22 VITALS
HEIGHT: 72 IN | TEMPERATURE: 96.8 F | DIASTOLIC BLOOD PRESSURE: 70 MMHG | HEART RATE: 71 BPM | WEIGHT: 220 LBS | SYSTOLIC BLOOD PRESSURE: 128 MMHG | OXYGEN SATURATION: 99 % | BODY MASS INDEX: 29.8 KG/M2 | RESPIRATION RATE: 16 BRPM

## 2025-07-22 DIAGNOSIS — I10 PRIMARY HYPERTENSION: Primary | ICD-10-CM

## 2025-07-22 DIAGNOSIS — Z12.11 COLON CANCER SCREENING: ICD-10-CM

## 2025-07-22 DIAGNOSIS — R97.20 ELEVATED PSA: ICD-10-CM

## 2025-07-22 DIAGNOSIS — E11.8 TYPE II DIABETES MELLITUS WITH COMPLICATION: ICD-10-CM

## 2025-07-22 PROCEDURE — 3046F HEMOGLOBIN A1C LEVEL >9.0%: CPT | Performed by: INTERNAL MEDICINE

## 2025-07-22 PROCEDURE — 3078F DIAST BP <80 MM HG: CPT | Performed by: INTERNAL MEDICINE

## 2025-07-22 PROCEDURE — 3074F SYST BP LT 130 MM HG: CPT | Performed by: INTERNAL MEDICINE

## 2025-07-22 PROCEDURE — 99214 OFFICE O/P EST MOD 30 MIN: CPT | Performed by: INTERNAL MEDICINE

## 2025-07-22 PROCEDURE — G2211 COMPLEX E/M VISIT ADD ON: HCPCS | Performed by: INTERNAL MEDICINE

## 2025-07-22 NOTE — PROGRESS NOTES
"Chief Complaint  Diabetes, Hypertension, and Hyperlipidemia    Subjective        Becki Cruz presents to [facility]  Diabetes  Hypertension  Hyperlipidemia      He has been exercising more on the treadmill at least a mile a day every day.  Weight is down slightly.  No polyuria or polydipsia or visual changes.  No chest pains or shortness of breath.  Taking medication as prescribed.  No side effects reported.  He said he never got a message on the PSA level.  Mildly elevated even for age adjustment.  Repeat level today.  No change in symptoms.  He has nocturia usually once or twice.  No significant urgency or frequency during the day  Objective   Vital Signs:  /70 (BP Location: Left arm, Patient Position: Sitting, Cuff Size: Large Adult)   Pulse 71   Temp 96.8 °F (36 °C) (Temporal)   Resp 16   Ht 182.9 cm (72\")   Wt 99.8 kg (220 lb)   SpO2 99%   BMI 29.84 kg/m²   Estimated body mass index is 29.84 kg/m² as calculated from the following:    Height as of this encounter: 182.9 cm (72\").    Weight as of this encounter: 99.8 kg (220 lb).          Physical Exam  Skin:     Comments: Diabetic Foot Exam Performed         Psychiatric:         Mood and Affect: Mood normal.         Behavior: Behavior normal.         Thought Content: Thought content normal.         Judgment: Judgment normal.        Result Review :    [facility]          10/11/2024    09:36 1/13/2025    00:00 4/11/2025    08:43   [facility]   Glucose 151  199  182    BUN 13  13  17    Creatinine 0.96  0.89  0.93    EGFR 82.9  89.9  86    Sodium 141  138  139    Potassium 3.8  4.0  4.1    Chloride 99  102  97    Calcium 10.0  9.7  10.3    Total Protein 6.7  7.0  7.4    Albumin 4.3  4.5  4.8    Globulin 2.4  2.5  2.6    Total Bilirubin 0.4  0.4  0.5    Alkaline Phosphatase 68  62  73    AST (SGOT) 23  17  22    ALT (SGPT) 16  16  17    Albumin/Globulin Ratio 1.8  1.8     BUN/Creatinine Ratio 13.5  14.6  18      CBC  "         4/11/2025    08:43   CBC   WBC 8.7    RBC 4.93    Hemoglobin 14.1    Hematocrit 43.2    MCV 88    MCH 28.6    MCHC 32.6    RDW 12.9    Platelets 292      TSH          10/11/2024    09:36 4/11/2025    08:43   TSH   TSH 2.580  2.580      A1C Last 3 Results          10/11/2024    09:36 1/13/2025    00:00 4/11/2025    08:43   HGBA1C Last 3 Results   Hemoglobin A1C 7.50  8.50  9.3      PSA          4/11/2025    08:43   PSA   PSA 8.5                Assessment and Plan   Diagnoses and all orders for this visit:    1. Primary hypertension (Primary)    2. Type II diabetes mellitus with complication  -     Comprehensive Metabolic Panel  -     Hemoglobin A1c  -     Lipid Panel With / Chol / HDL Ratio  -     Microalbumin / Creatinine Urine Ratio - Urine, Clean Catch  -     TSH  -     Urinalysis With Microscopic - Urine, Clean Catch  -     PSA DIAGNOSTIC    3. Elevated PSA  -     Comprehensive Metabolic Panel  -     Hemoglobin A1c  -     Lipid Panel With / Chol / HDL Ratio  -     Microalbumin / Creatinine Urine Ratio - Urine, Clean Catch  -     TSH  -     Urinalysis With Microscopic - Urine, Clean Catch  -     PSA DIAGNOSTIC    4. Colon cancer screening  -     Cologuard - Stool, Per Rectum; Future    Type 2 diabetes with suboptimal control.  Some mild neuropathic symptoms at night but nothing painful.  Encouraged tight blood sugar control.  He is walking every day on the treadmill which is great.  Recheck lab work.  Titrate pending results.  He is reluctant to do any GLP-1 agonist  Mild PSA elevation.  Recheck PSA today.  If it still elevated we will do an MRI of the prostate         Follow Up   Return in about 3 months (around 10/22/2025) for Recheck.  Patient was given instructions and counseling regarding his condition or for health maintenance advice. Please see specific information pulled into the AVS if appropriate.

## 2025-07-23 ENCOUNTER — RESULTS FOLLOW-UP (OUTPATIENT)
Dept: INTERNAL MEDICINE | Facility: CLINIC | Age: 75
End: 2025-07-23
Payer: MEDICARE

## 2025-07-23 DIAGNOSIS — R97.20 ELEVATED PSA: Primary | ICD-10-CM

## 2025-07-23 LAB
ALBUMIN SERPL-MCNC: 4.7 G/DL (ref 3.8–4.8)
ALP SERPL-CCNC: 73 IU/L (ref 44–121)
ALT SERPL-CCNC: 15 IU/L (ref 0–44)
AST SERPL-CCNC: 21 IU/L (ref 0–40)
BILIRUB SERPL-MCNC: 0.5 MG/DL (ref 0–1.2)
BUN SERPL-MCNC: 14 MG/DL (ref 8–27)
BUN/CREAT SERPL: 16 (ref 10–24)
CALCIUM SERPL-MCNC: 10.2 MG/DL (ref 8.6–10.2)
CHLORIDE SERPL-SCNC: 99 MMOL/L (ref 96–106)
CHOLEST SERPL-MCNC: 131 MG/DL (ref 100–199)
CHOLEST/HDLC SERPL: 2.8 RATIO (ref 0–5)
CO2 SERPL-SCNC: 22 MMOL/L (ref 20–29)
CREAT SERPL-MCNC: 0.9 MG/DL (ref 0.76–1.27)
CREAT UR-MCNC: NORMAL MG/DL
EGFRCR SERPLBLD CKD-EPI 2021: 89 ML/MIN/1.73
GLOBULIN SER CALC-MCNC: 2.9 G/DL (ref 1.5–4.5)
GLUCOSE SERPL-MCNC: 152 MG/DL (ref 70–99)
GLUCOSE UR QL STRIP: NORMAL
HBA1C MFR BLD: 8.2 % (ref 4.8–5.6)
HDLC SERPL-MCNC: 46 MG/DL
KETONES UR QL STRIP: NORMAL
LDLC SERPL CALC-MCNC: 62 MG/DL (ref 0–99)
MICROALBUMIN UR-MCNC: NORMAL
PH UR STRIP: NORMAL [PH]
POTASSIUM SERPL-SCNC: 3.9 MMOL/L (ref 3.5–5.2)
PROT SERPL-MCNC: 7.6 G/DL (ref 6–8.5)
PROT UR QL STRIP: NORMAL
PSA SERPL-MCNC: 10.8 NG/ML (ref 0–4)
SODIUM SERPL-SCNC: 138 MMOL/L (ref 134–144)
SP GR UR STRIP: NORMAL
TRIGL SERPL-MCNC: 134 MG/DL (ref 0–149)
TSH SERPL DL<=0.005 MIU/L-ACNC: 3.09 UIU/ML (ref 0.45–4.5)
VLDLC SERPL CALC-MCNC: 23 MG/DL (ref 5–40)

## 2025-07-23 NOTE — TELEPHONE ENCOUNTER
Talk to the patient about lab work.  A1c is much better at 8.2 down from 9.3 and cholesterol looks great.  Continue with the exercise and diet.  Recheck that in 3 months.  PSA still elevated.  Schedule MRI of the prostate with and without contrast and final disposition pending results

## 2025-08-02 ENCOUNTER — HOSPITAL ENCOUNTER (OUTPATIENT)
Facility: HOSPITAL | Age: 75
Discharge: HOME | End: 2025-08-02
Payer: MEDICARE

## 2025-08-02 DIAGNOSIS — R97.20 ELEVATED PSA: ICD-10-CM

## 2025-08-02 PROCEDURE — 72197 MRI PELVIS W/O & W/DYE: CPT

## 2025-08-02 PROCEDURE — A9577 INJ MULTIHANCE: HCPCS | Performed by: INTERNAL MEDICINE

## 2025-08-02 PROCEDURE — 25510000002 GADOBENATE DIMEGLUMINE 529 MG/ML SOLUTION: Performed by: INTERNAL MEDICINE

## 2025-08-02 RX ADMIN — GADOBENATE DIMEGLUMINE 19 ML: 529 INJECTION, SOLUTION INTRAVENOUS at 09:36

## 2025-08-05 RX ORDER — RIVAROXABAN 20 MG/1
20 TABLET, FILM COATED ORAL
Qty: 90 TABLET | Refills: 4 | Status: SHIPPED | OUTPATIENT
Start: 2025-08-05

## 2025-08-08 ENCOUNTER — RESULTS FOLLOW-UP (OUTPATIENT)
Dept: INTERNAL MEDICINE | Facility: CLINIC | Age: 75
End: 2025-08-08
Payer: MEDICARE

## 2025-08-08 DIAGNOSIS — R97.20 ELEVATED PSA: Primary | ICD-10-CM

## 2025-08-11 ENCOUNTER — OFFICE VISIT (OUTPATIENT)
Dept: INTERNAL MEDICINE | Facility: CLINIC | Age: 75
End: 2025-08-11
Payer: MEDICARE

## 2025-08-11 ENCOUNTER — TELEPHONE (OUTPATIENT)
Dept: INTERNAL MEDICINE | Facility: CLINIC | Age: 75
End: 2025-08-11

## 2025-08-11 VITALS
WEIGHT: 218 LBS | BODY MASS INDEX: 29.53 KG/M2 | HEIGHT: 72 IN | OXYGEN SATURATION: 95 % | DIASTOLIC BLOOD PRESSURE: 82 MMHG | HEART RATE: 93 BPM | RESPIRATION RATE: 19 BRPM | SYSTOLIC BLOOD PRESSURE: 130 MMHG

## 2025-08-11 DIAGNOSIS — M54.81 OCCIPITAL NEURALGIA OF RIGHT SIDE: Primary | ICD-10-CM

## 2025-08-11 PROCEDURE — 3052F HG A1C>EQUAL 8.0%<EQUAL 9.0%: CPT | Performed by: INTERNAL MEDICINE

## 2025-08-11 PROCEDURE — G2211 COMPLEX E/M VISIT ADD ON: HCPCS | Performed by: INTERNAL MEDICINE

## 2025-08-11 PROCEDURE — 3075F SYST BP GE 130 - 139MM HG: CPT | Performed by: INTERNAL MEDICINE

## 2025-08-11 PROCEDURE — 3079F DIAST BP 80-89 MM HG: CPT | Performed by: INTERNAL MEDICINE

## 2025-08-11 PROCEDURE — 99214 OFFICE O/P EST MOD 30 MIN: CPT | Performed by: INTERNAL MEDICINE

## 2025-08-11 RX ORDER — GABAPENTIN 300 MG/1
300 CAPSULE ORAL 2 TIMES DAILY
Qty: 60 CAPSULE | Refills: 0 | Status: SHIPPED | OUTPATIENT
Start: 2025-08-11